# Patient Record
Sex: MALE | Race: WHITE | NOT HISPANIC OR LATINO | Employment: FULL TIME | URBAN - NONMETROPOLITAN AREA
[De-identification: names, ages, dates, MRNs, and addresses within clinical notes are randomized per-mention and may not be internally consistent; named-entity substitution may affect disease eponyms.]

---

## 2024-05-16 ENCOUNTER — HOSPITAL ENCOUNTER (EMERGENCY)
Facility: HOSPITAL | Age: 44
Discharge: HOME | End: 2024-05-16
Payer: MEDICAID

## 2024-05-16 VITALS
HEIGHT: 72 IN | SYSTOLIC BLOOD PRESSURE: 110 MMHG | OXYGEN SATURATION: 95 % | RESPIRATION RATE: 14 BRPM | BODY MASS INDEX: 26.01 KG/M2 | WEIGHT: 192 LBS | DIASTOLIC BLOOD PRESSURE: 68 MMHG | TEMPERATURE: 97.4 F | HEART RATE: 59 BPM

## 2024-05-16 DIAGNOSIS — M79.604 RIGHT LEG PAIN: ICD-10-CM

## 2024-05-16 DIAGNOSIS — R60.0 LEG EDEMA, RIGHT: Primary | ICD-10-CM

## 2024-05-16 LAB — D DIMER PPP FEU-MCNC: 315 NG/ML FEU

## 2024-05-16 PROCEDURE — 99283 EMERGENCY DEPT VISIT LOW MDM: CPT

## 2024-05-16 PROCEDURE — 36415 COLL VENOUS BLD VENIPUNCTURE: CPT | Performed by: PHYSICIAN ASSISTANT

## 2024-05-16 PROCEDURE — 85379 FIBRIN DEGRADATION QUANT: CPT | Performed by: PHYSICIAN ASSISTANT

## 2024-05-16 ASSESSMENT — PAIN - FUNCTIONAL ASSESSMENT: PAIN_FUNCTIONAL_ASSESSMENT: 0-10

## 2024-05-16 ASSESSMENT — ENCOUNTER SYMPTOMS
EYE PAIN: 0
FEVER: 0
SHORTNESS OF BREATH: 0
ARTHRALGIAS: 0
CHILLS: 0
ABDOMINAL PAIN: 0
VOMITING: 0
BACK PAIN: 0
SEIZURES: 0
COUGH: 0
SORE THROAT: 0
PALPITATIONS: 0
COLOR CHANGE: 0
DYSURIA: 0
HEMATURIA: 0

## 2024-05-16 ASSESSMENT — COLUMBIA-SUICIDE SEVERITY RATING SCALE - C-SSRS
6. HAVE YOU EVER DONE ANYTHING, STARTED TO DO ANYTHING, OR PREPARED TO DO ANYTHING TO END YOUR LIFE?: NO
1. IN THE PAST MONTH, HAVE YOU WISHED YOU WERE DEAD OR WISHED YOU COULD GO TO SLEEP AND NOT WAKE UP?: NO
2. HAVE YOU ACTUALLY HAD ANY THOUGHTS OF KILLING YOURSELF?: NO

## 2024-05-16 ASSESSMENT — PAIN SCALES - GENERAL: PAINLEVEL_OUTOF10: 8
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
## 2024-05-16 NOTE — DISCHARGE INSTRUCTIONS
You have a venous duplex of your right lower extremity scheduled tomorrow morning at 7 AM.  Please go to the main entrance of the hospital to register.  They will direct you to the correct location.  If your venous duplex is positive they will tell you to come back to the emergency room or they will call your family physician.

## 2024-05-17 ENCOUNTER — TELEPHONE (OUTPATIENT)
Dept: EMERGENCY MEDICINE | Facility: HOSPITAL | Age: 44
End: 2024-05-17

## 2024-05-17 ENCOUNTER — HOSPITAL ENCOUNTER (OUTPATIENT)
Dept: VASCULAR MEDICINE | Facility: HOSPITAL | Age: 44
Discharge: HOME | End: 2024-05-17
Payer: MEDICAID

## 2024-05-17 DIAGNOSIS — R60.0 LEG EDEMA, RIGHT: ICD-10-CM

## 2024-05-17 DIAGNOSIS — M79.661 PAIN IN RIGHT LOWER LEG: ICD-10-CM

## 2024-05-17 DIAGNOSIS — M79.604 RIGHT LEG PAIN: ICD-10-CM

## 2024-05-17 PROCEDURE — 93971 EXTREMITY STUDY: CPT | Performed by: INTERNAL MEDICINE

## 2024-05-17 PROCEDURE — 93971 EXTREMITY STUDY: CPT

## 2024-06-27 ENCOUNTER — APPOINTMENT (OUTPATIENT)
Dept: RADIOLOGY | Facility: HOSPITAL | Age: 44
End: 2024-06-27
Payer: MEDICAID

## 2024-06-27 ENCOUNTER — HOSPITAL ENCOUNTER (EMERGENCY)
Facility: HOSPITAL | Age: 44
Discharge: HOME | End: 2024-06-27
Attending: EMERGENCY MEDICINE
Payer: MEDICAID

## 2024-06-27 VITALS
RESPIRATION RATE: 18 BRPM | TEMPERATURE: 98.2 F | HEART RATE: 62 BPM | SYSTOLIC BLOOD PRESSURE: 131 MMHG | DIASTOLIC BLOOD PRESSURE: 89 MMHG | OXYGEN SATURATION: 96 % | BODY MASS INDEX: 25.73 KG/M2 | WEIGHT: 190 LBS | HEIGHT: 72 IN

## 2024-06-27 DIAGNOSIS — S93.491A SPRAIN OF OTHER LIGAMENT OF RIGHT ANKLE, INITIAL ENCOUNTER: Primary | ICD-10-CM

## 2024-06-27 DIAGNOSIS — S93.601A FOOT SPRAIN, RIGHT, INITIAL ENCOUNTER: ICD-10-CM

## 2024-06-27 PROCEDURE — 99284 EMERGENCY DEPT VISIT MOD MDM: CPT

## 2024-06-27 PROCEDURE — 73610 X-RAY EXAM OF ANKLE: CPT | Mod: RT

## 2024-06-27 PROCEDURE — 73630 X-RAY EXAM OF FOOT: CPT | Mod: RT

## 2024-06-27 PROCEDURE — 73610 X-RAY EXAM OF ANKLE: CPT | Mod: RIGHT SIDE | Performed by: RADIOLOGY

## 2024-06-27 PROCEDURE — 73630 X-RAY EXAM OF FOOT: CPT | Mod: RIGHT SIDE | Performed by: RADIOLOGY

## 2024-06-27 ASSESSMENT — VISUAL ACUITY: OU: 1

## 2024-06-27 ASSESSMENT — PAIN - FUNCTIONAL ASSESSMENT: PAIN_FUNCTIONAL_ASSESSMENT: 0-10

## 2024-06-27 ASSESSMENT — PAIN DESCRIPTION - LOCATION: LOCATION: FOOT

## 2024-06-27 ASSESSMENT — COLUMBIA-SUICIDE SEVERITY RATING SCALE - C-SSRS
1. IN THE PAST MONTH, HAVE YOU WISHED YOU WERE DEAD OR WISHED YOU COULD GO TO SLEEP AND NOT WAKE UP?: NO
6. HAVE YOU EVER DONE ANYTHING, STARTED TO DO ANYTHING, OR PREPARED TO DO ANYTHING TO END YOUR LIFE?: NO
2. HAVE YOU ACTUALLY HAD ANY THOUGHTS OF KILLING YOURSELF?: NO

## 2024-06-27 ASSESSMENT — PAIN SCALES - GENERAL: PAINLEVEL_OUTOF10: 8

## 2024-06-27 NOTE — ED PROVIDER NOTES
Twisting injury to right foot and ankle.  This 43-year-old white male states that yesterday he twisted his foot and ankle when he stepped into a hole at his workplace.  He states that he had an injury to the same foot approximately 5 years ago and had to wear a boot never had surgery..  Patient states that pain is located primarily in the lateral aspect of the right foot and the anterior ankle area.  He denies any proximal fibular pain.  Denies any other injury from this fall.      History provided by:  Patient   used: No         Physical Exam  Vitals and nursing note reviewed.   Constitutional:       General: He is awake.      Appearance: Normal appearance. He is normal weight.   HENT:      Head: Normocephalic and atraumatic.      Right Ear: Hearing and external ear normal.      Left Ear: Hearing and external ear normal.      Nose: Nose normal. No congestion or rhinorrhea.      Mouth/Throat:      Lips: Pink.      Mouth: Mucous membranes are moist.      Pharynx: Oropharynx is clear. No oropharyngeal exudate or posterior oropharyngeal erythema.   Eyes:      General: Lids are normal. Vision grossly intact.         Right eye: No discharge.         Left eye: No discharge.      Extraocular Movements: Extraocular movements intact.      Conjunctiva/sclera: Conjunctivae normal.      Pupils: Pupils are equal, round, and reactive to light.   Cardiovascular:      Rate and Rhythm: Normal rate and regular rhythm.      Pulses: Normal pulses.      Heart sounds: Normal heart sounds. No murmur heard.     No friction rub. No gallop.   Pulmonary:      Effort: Pulmonary effort is normal. No respiratory distress.      Breath sounds: Normal breath sounds. No stridor. No wheezing, rhonchi or rales.   Chest:      Chest wall: No tenderness.   Abdominal:      General: Abdomen is flat. Bowel sounds are normal. There is no distension.      Palpations: Abdomen is soft. There is no mass.      Tenderness: There is no abdominal  tenderness. There is no guarding or rebound.      Hernia: No hernia is present.   Musculoskeletal:         General: No swelling, deformity or signs of injury. Normal range of motion.      Cervical back: Full passive range of motion without pain, normal range of motion and neck supple.      Right lower leg: Swelling and tenderness present. No edema.      Left lower leg: Normal. No edema.        Legs:         Feet:       Comments: Evaluation of the right lower extremity revealed distal pulses are +2/4 and present the dorsalis pedis and tibialis.  Cap refill less than 2 seconds and light touch sensations intact.  Patient has some tenderness along the lateral aspect of the foot along the base of the fifth metatarsal.  Patient also has some anterior ankle tenderness with associated swelling.  No proximal fibular tenderness on examination.  Patient has normal +12 5 strength with dorsi and plantarflexion at the ankle.   Skin:     General: Skin is warm and dry.      Capillary Refill: Capillary refill takes less than 2 seconds.      Coloration: Skin is not jaundiced or pale.      Findings: No bruising, erythema, lesion or rash.   Neurological:      General: No focal deficit present.      Mental Status: He is alert and oriented to person, place, and time.      GCS: GCS eye subscore is 4. GCS verbal subscore is 5. GCS motor subscore is 6.      Cranial Nerves: Cranial nerves 2-12 are intact. No cranial nerve deficit.      Sensory: Sensation is intact. No sensory deficit.      Motor: Motor function is intact. No weakness.      Coordination: Coordination is intact. Coordination normal.      Deep Tendon Reflexes: Reflexes normal.   Psychiatric:         Attention and Perception: Attention and perception normal.         Mood and Affect: Mood normal.         Speech: Speech normal.         Behavior: Behavior normal. Behavior is cooperative.         Thought Content: Thought content normal.         Cognition and Memory: Cognition and  memory normal.         Judgment: Judgment normal.          Labs Reviewed - No data to display     XR foot right 3+ views   Final Result   No evidence for acute injury.   Signed by William Garcia MD      XR ankle right 3+ views   Final Result   No evidence for acute injury.   Signed by William Garcia MD           Procedures     Medical Decision Making  Patient was seen and evaluated due to an injury to his right foot and ankle.  Clinically patient has anterior ankle sprain and right foot sprain.  Patient imaging of the right ankle and foot performed these were negative for acute fracture or acute abnormality.  I did have a discussion with patient concerning his x-ray results and he was treated with an Aircast and crutches and referred to a primary care doctor for follow-up.  He was also provided a list of primary care doctors locally that he could follow-up with.         Diagnoses as of 07/01/24 0720   Sprain of other ligament of right ankle, initial encounter   Foot sprain, right, initial encounter                    Shawn Caldwell, DO  07/01/24 0720

## 2024-12-12 ENCOUNTER — EVALUATION (OUTPATIENT)
Dept: OCCUPATIONAL THERAPY | Facility: CLINIC | Age: 44
End: 2024-12-12
Payer: MEDICAID

## 2024-12-12 DIAGNOSIS — S62.102A LEFT WRIST FRACTURE: Primary | ICD-10-CM

## 2024-12-12 DIAGNOSIS — S42.401A CLOSED FRACTURE DISLOCATION OF RIGHT ELBOW: ICD-10-CM

## 2024-12-12 PROCEDURE — 97110 THERAPEUTIC EXERCISES: CPT | Mod: GO

## 2024-12-12 PROCEDURE — 97165 OT EVAL LOW COMPLEX 30 MIN: CPT | Mod: GO

## 2024-12-12 ASSESSMENT — ENCOUNTER SYMPTOMS
DEPRESSION: 0
LOSS OF SENSATION IN FEET: 0
OCCASIONAL FEELINGS OF UNSTEADINESS: 0

## 2024-12-12 ASSESSMENT — PAIN SCALES - GENERAL: PAINLEVEL_OUTOF10: 8

## 2024-12-12 ASSESSMENT — PAIN DESCRIPTION - DESCRIPTORS: DESCRIPTORS: BURNING

## 2024-12-12 ASSESSMENT — PAIN - FUNCTIONAL ASSESSMENT: PAIN_FUNCTIONAL_ASSESSMENT: 0-10

## 2024-12-12 NOTE — LETTER
December 12, 2024    Kya Campbell MD  [address]  [place] [id number]    Patient: Ga Camp   YOB: 1980   Date of Visit: 12/12/2024       Dear Kya Campbell MD  [address]  [place] [address]    The attached plan of care is being sent to you because your patient’s medical reimbursement requires that you certify the plan of care. Your signature is required to allow uninterrupted insurance coverage.      You may indicate your approval by signing below and faxing this form back to us at Dept Fax: [phone].    Please call Dept: [phone] with any questions or concerns.    Thank you for this referral,        Twila Harris OT  [address]  [address] [place] [id number]    Payer: Payor: [employer] / Plan: [employer] / Product Type: *No Product type* /                                                                         Date:     Dear Twila Harris OT,     Re: Mr. Ga Camp, MRN:[id number]    I certify that I have reviewed the attached plan of care and it is medically necessary for Mr. Ga Camp (1980) who is under my care.          ______________________________________                    _________________  Provider name and credentials                                           Date and time                                                                                           Plan of Care 12/12/24   Effective from: 12/12/2024  Effective to: 3/6/2025    Plan ID: [id number]            Participants as of Finalize on 12/12/2024    Name Type Comments Contact Info    Kya Campbell MD Referring Provider  [phone]    Twila Harris OT Occupational Therapist  [phone]       Last Plan Note     Author: Twila Harris OT Status: Incomplete Last edited: 12/12/2024  5:15 PM       Occupational Therapy    Evaluation/Treatment    Patient Name: Ga VERMA  Conrado  MRN: [id number]  : 1980  Today's Date: 24     Time Calculation  Start Time: 1715  Stop Time: 1800  Time Calculation (min): 45 min  Therapeutic Procedure Codes:  OT Evaluation Time Entry  OT Evaluation (Low) Time Entry: 35   OT Therapeutic Procedures Time Entry  Therapeutic Exercise Time Entry: 10                         Subjective  Current Problem:  1. Left wrist fracture  Referral to Occupational Therapy    Follow Up In Occupational Therapy      2. 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 Pt went to ER and had emergency surgery. Pt was in the hospital for about 7 days. Pt has been referred to OT to increase ROM and strength when appropriate . Pt is on Wbing precautions and has metal in BUE. Pt would like to gain ROM and be able to complete ADLs.     General  Reason for Referral: 1. Left wrist fracture  Referral to Occupational Therapy    2. Closed fracture dislocation of right elbow  Referred By: Adrian  General Comment: eval        Precautions:  Precautions Comment: non WB for R elbow and L wrist; Metal in BUE     I have reviewed patients medical history form.   Pain:  Pain Assessment  Pain Assessment: 0-10  0-10 (Numeric) Pain Score: 8  Pain Type: Acute pain  Pain Location: Arm  Pain Orientation: Right, Left  Pain Descriptors: Burning    Objective     Home Living:  Type of Home: Mobile home  Lives With: Spouse   Prior Function:  Level of [place]: Independent with ADLs and functional transfers   ADL/IADL:   All ADL and IADL - MAX A    Off work   Therapy/Activity:    DOI: Post-op:   Re-check due on:    Exercises: Reps:                           HEP provided to pt on BUE ROM HEP provided to pt including BUE ROM. Pt instructed to complete 3x a day, 10 reps within pain free range. Handouts  provided to pt.    Activities:                    Modalities:                    Manual:                    Functional review:  FM, and ROM   Completed on: 12/12/2024      Measurements:   Elbow ROM:  WFL unless documented below   Flexion Extension  Supination Pronation   Right 95 +25 35 25   Left 150 0 45 65     Wrist Measurements:  WFL unless documented below   Flexion  Extension Radial Dev Ulnar Dev   Right 45 40 0 25   Left 45 10 5 25     Digit Measurements:Left   WFL unless documented below   MCP PIP DIP DPC   Thumb 30 30 NA 3   Index 55 80 60 4.5   Long 55 80 65 2.5   Ring 55 80 65 3   Small 50 80 65 2     Digit Measurements: Right  WFL unless documented below   MCP PIP DIP DPC   Thumb 55 55 NA 2.5   Index 45 70 45 4   Long 45 75 60 4   Ring 35 75 60 2.5   Small 35 75 60 3.5       Nine Hole Peg Test:  RUE 34 seconds   LUE 30 seconds       Sensation:   Pt reported WFL     Outcome Measures:   Quickdash Scores: 55;100%    OP EDUCATION:  Education  Individual(s) Educated: Patient  Education Provided: Diagnosis & Precautions, POC discussed and agreed upon  Home Program: [facility], Handout issued  Risk and Benefits Discussed with Patient/Caregiver/Other: yes  Patient/Caregiver Demonstrated Understanding: yes  Plan of Care Discussed and Agreed Upon: yes  Patient Response to Education: Patient/Caregiver Verbalized Understanding of Information    Assessment:  Pt is a 44 y.o. male who presents to this facility with performance deficits in BUE limiting ability to complete ADL and IADL tasks. Pt  provided with HEP including BUE ROM. Handouts provided to pt. Pt demonstrated understanding. OT assessed ROM and FM, pt showed deficits in FM, ROM. Pt would benefit from skilled OT to address deficits. When appropriate, pt should be assessed for strength.           Plan:  Treatment Interventions: UE strengthening/ROM, ADL retraining, Patient/family training, Endurance training, Fine motor coordination activities, Compensatory technique  education, UE splinting, Continued evaluation  OT Plan: Occupational therapy intervention plan to include education/instruction, electrical stimulation, hot pack, ultrasound, manual therapy,  orthotic fitting/training, self-care/home management, therapeutic exercises, therapeutic activities, and home program.  Frequency: 2x a week  Duration: 8x weeks    Goals:  Active       OT Goals       Develop and issue HEP to help maximize ROM, strength and tolerance to help maximize return to all pre-onset activities.        Start:  12/12/24    Expected End:  03/06/25            LTG - Patient will indicate/ demonstrate the ability to resume all preinjury ADLs and IADLs without significant limits secondary to decreased ROM, decreased strength and/or pain as indicated by Quickdash score of less than 20%.        Start:  12/12/24    Expected End:  03/06/25            Patient will demonstrate a progressive increase in ROM as appropriate with BUE to be within 5-10 degrees of age norms to help patient resume normal ADL and IADL function.        Start:  12/12/24    Expected End:  03/06/25            Pain to be less than or equal to 1/10 with greater than or equal to 45 minutes activity.        Start:  12/12/24    Expected End:  03/06/25            Pt will demonstrate increased  strength, when appropriate, with BUE  to be greater than or equal to 80% of the norms to help patient resume ADLs and IADLs.        Start:  12/12/24    Expected End:  03/06/25                   Current Participants as of 12/12/2024    Name Type Comments Contact Info    Kya Campbell MD Referring Provider  [phone]    Signature pending    Twila Harris OT Occupational Therapist  [phone]    Electronically signed by Twila Harris OT at 12/12/2024 4492 EST

## 2024-12-12 NOTE — PROGRESS NOTES
Occupational Therapy    Evaluation/Treatment    Patient Name: Ga Camp  MRN: [id number]  : 1980  Today's Date: 24     Time Calculation  Start Time: 1715  Stop Time: 1800  Time Calculation (min): 45 min  Therapeutic Procedure Codes:  OT Evaluation Time Entry  OT Evaluation (Low) Time Entry: 35   OT Therapeutic Procedures Time Entry  Therapeutic Exercise Time Entry: 10                         Subjective   Current Problem:  1. Left wrist fracture  Referral to Occupational Therapy    Follow Up In Occupational Therapy      2. 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 Pt went to ER and had emergency surgery. Pt was in the hospital for about 7 days. Pt has been referred to OT to increase ROM and strength when appropriate . Pt is on Wbing precautions and has metal in BUE. Pt would like to gain ROM and be able to complete ADLs.     General  Reason for Referral: 1. Left wrist fracture  Referral to Occupational Therapy    2. Closed fracture dislocation of right elbow  Referred By: Adrian  General Comment: eval        Precautions:  Precautions Comment: non WB for R elbow and L wrist; Metal in BUE     I have reviewed patients medical history form.   Pain:  Pain Assessment  Pain Assessment: 0-10  0-10 (Numeric) Pain Score: 8  Pain Type: Acute pain  Pain Location: Arm  Pain Orientation: Right, Left  Pain Descriptors: Burning    Objective      Home Living:  Type of Home: Mobile home  Lives With: Spouse   Prior Function:  Level of [place]: Independent with ADLs and functional transfers   ADL/IADL:   All ADL and IADL - MAX A    Off work   Therapy/Activity:    DOI: Post-op:   Re-check due on:    Exercises: Reps:                           HEP provided to pt on BUE ROM HEP provided to pt including BUE ROM. Pt  instructed to complete 3x a day, 10 reps within pain free range. Handouts provided to pt.    Activities:                    Modalities:                    Manual:                    Functional review:  FM, and ROM   Completed on: 12/12/2024      Measurements:   Elbow ROM:  WFL unless documented below   Flexion Extension  Supination Pronation   Right 95 +25 35 25   Left 150 0 45 65     Wrist Measurements:  WFL unless documented below   Flexion  Extension Radial Dev Ulnar Dev   Right 45 40 0 25   Left 45 10 5 25     Digit Measurements:Left   WFL unless documented below   MCP PIP DIP DPC   Thumb 30 30 NA 3   Index 55 80 60 4.5   Long 55 80 65 2.5   Ring 55 80 65 3   Small 50 80 65 2     Digit Measurements: Right  WFL unless documented below   MCP PIP DIP DPC   Thumb 55 55 NA 2.5   Index 45 70 45 4   Long 45 75 60 4   Ring 35 75 60 2.5   Small 35 75 60 3.5       Nine Hole Peg Test:  RUE 34 seconds   LUE 30 seconds       Sensation:   Pt reported WFL     Outcome Measures:   Quickdash Scores: 55;100%    OP EDUCATION:  Education  Individual(s) Educated: Patient  Education Provided: Diagnosis & Precautions, POC discussed and agreed upon  Home Program: [facility], Handout issued  Risk and Benefits Discussed with Patient/Caregiver/Other: yes  Patient/Caregiver Demonstrated Understanding: yes  Plan of Care Discussed and Agreed Upon: yes  Patient Response to Education: Patient/Caregiver Verbalized Understanding of Information    Assessment:  Pt is a 44 y.o. male who presents to this facility with performance deficits in BUE limiting ability to complete ADL and IADL tasks. Pt  provided with HEP including BUE ROM. Handouts provided to pt. Pt demonstrated understanding. OT assessed ROM and FM, pt showed deficits in FM, ROM. Pt would benefit from skilled OT to address deficits. When appropriate, pt should be assessed for strength.           Plan:  Treatment Interventions: UE strengthening/ROM, ADL retraining, Patient/family training,  Endurance training, Fine motor coordination activities, Compensatory technique education, UE splinting, Continued evaluation  OT Plan: Occupational therapy intervention plan to include education/instruction, electrical stimulation, hot pack, ultrasound, manual therapy,  orthotic fitting/training, self-care/home management, therapeutic exercises, therapeutic activities, and home program.  Frequency: 2x a week  Duration: 8x weeks    Goals:  Active       OT Goals       Develop and issue HEP to help maximize ROM, strength and tolerance to help maximize return to all pre-onset activities.        Start:  12/12/24    Expected End:  03/06/25            LTG - Patient will indicate/ demonstrate the ability to resume all preinjury ADLs and IADLs without significant limits secondary to decreased ROM, decreased strength and/or pain as indicated by Quickdash score of less than 20%.        Start:  12/12/24    Expected End:  03/06/25            Patient will demonstrate a progressive increase in ROM as appropriate with BUE to be within 5-10 degrees of age norms to help patient resume normal ADL and IADL function.        Start:  12/12/24    Expected End:  03/06/25            Pain to be less than or equal to 1/10 with greater than or equal to 45 minutes activity.        Start:  12/12/24    Expected End:  03/06/25            Pt will demonstrate increased  strength, when appropriate, with BUE  to be greater than or equal to 80% of the norms to help patient resume ADLs and IADLs.        Start:  12/12/24    Expected End:  03/06/25

## 2024-12-17 ENCOUNTER — TREATMENT (OUTPATIENT)
Dept: OCCUPATIONAL THERAPY | Facility: CLINIC | Age: 44
End: 2024-12-17
Payer: MEDICAID

## 2024-12-17 DIAGNOSIS — S42.401A CLOSED FRACTURE DISLOCATION OF RIGHT ELBOW: ICD-10-CM

## 2024-12-17 DIAGNOSIS — S62.102A LEFT WRIST FRACTURE: ICD-10-CM

## 2024-12-17 PROCEDURE — 97110 THERAPEUTIC EXERCISES: CPT | Mod: GO,CO

## 2024-12-17 PROCEDURE — L3908 WHO COCK-UP NONMOLDE PRE OTS: HCPCS

## 2024-12-17 PROCEDURE — 97530 THERAPEUTIC ACTIVITIES: CPT | Mod: GO,CO

## 2024-12-17 ASSESSMENT — PAIN DESCRIPTION - DESCRIPTORS: DESCRIPTORS: BURNING;DISCOMFORT

## 2024-12-17 ASSESSMENT — PAIN SCALES - GENERAL: PAINLEVEL_OUTOF10: 8

## 2024-12-17 ASSESSMENT — PAIN - FUNCTIONAL ASSESSMENT: PAIN_FUNCTIONAL_ASSESSMENT: 0-10

## 2024-12-17 NOTE — PROGRESS NOTES
"Occupational Therapy    Occupational Therapy Treatment    Name: Ga Camp  MRN: [id number]  : 1980  Date: 24    Time Entry:  Time Calculation  Start Time: 830  Stop Time: 915  Time Calculation (min): 45 min        OT Therapeutic Procedures Time Entry  Therapeutic Activity Time Entry: 35  Therapeutic Exercise Time Entry: 10                Assessment: Patient tolerating activities well, demos understanding of exercises to be completed at home, Demos understanding of brace and tubi .    Plan: Continue with current POC towards OT goals  OT Plan: TE,TA, Modalities  Duration: 8 weeks  Onset Date: 24  Certification Period Start Date: 24  Certification Period End Date: 25  Rehab Potential: Fair  Plan of Care Agreement: Patient    Subjective   General: Patient states he is still pretty sore, states L wrist feels very \"hot\"    General  Reason for Referral: 1. Left wrist fracture  Referral to Occupational Therapy    2. Closed fracture dislocation of right elbow  Referred By: Adrian  General Comment: Visit 1  Precautions:  Precautions Comment: non WB for R elbow and L wrist; Metal in BUE  Pain Assessment:  Pain Assessment  Pain Assessment: 0-10  0-10 (Numeric) Pain Score: 8  Pain Type: Acute pain  Pain Location: Arm  Pain Orientation: Right, Left  Pain Descriptors: Burning, Discomfort    Objective      Splinting  Location: L wrist  Type: Prefab wrist cock up  Splinting Education: Fitting, [place], [place], Wear schedule, Precautions    Therapy/Activity: Therapeutic Exercise  Therapeutic Exercise Performed: Yes  Therapeutic Exercise Activity 1: Initiated yellow foam block exercises for B hands (L hand tip pinch and gentle grasp, R hand grasp with extension)    Therapeutic Activity  Therapeutic Activity Performed: Yes  Therapeutic Activity 1: BUE AROM over ball flex/ext x 10 w/ 10 second holds each  Therapeutic Activity 2: Educated on heat for elbow and ice for wrist  Therapeutic Activity " 3: Educated on stretches for R elbow, L wrist  Therapeutic Activity 4: Provided with and educated on wrist splint  Therapeutic Activity 5: Provided with tubi  for L wrist for edema management      Goals:  Active       OT Goals       Develop and issue HEP to help maximize ROM, strength and tolerance to help maximize return to all pre-onset activities.        Start:  12/12/24    Expected End:  03/06/25            LTG - Patient will indicate/ demonstrate the ability to resume all preinjury ADLs and IADLs without significant limits secondary to decreased ROM, decreased strength and/or pain as indicated by Quickdash score of less than 20%.        Start:  12/12/24    Expected End:  03/06/25            Patient will demonstrate a progressive increase in ROM as appropriate with BUE to be within 5-10 degrees of age norms to help patient resume normal ADL and IADL function.        Start:  12/12/24    Expected End:  03/06/25            Pain to be less than or equal to 1/10 with greater than or equal to 45 minutes activity.        Start:  12/12/24    Expected End:  03/06/25            Pt will demonstrate increased  strength, when appropriate, with BUE  to be greater than or equal to 80% of the norms to help patient resume ADLs and IADLs.        Start:  12/12/24    Expected End:  03/06/25

## 2024-12-19 ENCOUNTER — TREATMENT (OUTPATIENT)
Dept: OCCUPATIONAL THERAPY | Facility: CLINIC | Age: 44
End: 2024-12-19
Payer: MEDICAID

## 2024-12-19 DIAGNOSIS — S42.401A CLOSED FRACTURE DISLOCATION OF RIGHT ELBOW: ICD-10-CM

## 2024-12-19 DIAGNOSIS — S62.102A LEFT WRIST FRACTURE: ICD-10-CM

## 2024-12-19 PROCEDURE — L3912 HFO FLEXION GLOVE PRE OTS: HCPCS

## 2024-12-19 PROCEDURE — 97530 THERAPEUTIC ACTIVITIES: CPT | Mod: GO

## 2024-12-19 PROCEDURE — 97140 MANUAL THERAPY 1/> REGIONS: CPT | Mod: GO

## 2024-12-19 ASSESSMENT — PAIN - FUNCTIONAL ASSESSMENT: PAIN_FUNCTIONAL_ASSESSMENT: 0-10

## 2024-12-19 ASSESSMENT — PAIN SCALES - GENERAL
PAINLEVEL_OUTOF10: 3
PAINLEVEL_OUTOF10: 5 - MODERATE PAIN

## 2024-12-19 NOTE — PROGRESS NOTES
Occupational Therapy    OT Treatment    Patient Name: Ga Camp  MRN: [id number]  Today's Date: 12/19/2024     Time Calculation  Start Time: 0915  Stop Time: 1000  Time Calculation (min): 45 min    Visit Number: 3  Therapeutic Procedures:      OT Therapeutic Procedures Time Entry  Manual Therapy Time Entry: 25  Therapeutic Activity Time Entry: 20                         Current Problem:  1. Left wrist fracture  Follow Up In Occupational Therapy      2. Closed fracture dislocation of right elbow  Follow Up In Occupational Therapy          Subjective     General: Pt came in with son. Patient is independent with current HEP. Pt reported he is making progress and he is using heat at home.         Reason for Referral: 1. Left wrist fracture  Referral to Occupational Therapy    2. Closed fracture dislocation of right elbow  Referred By: Adrian Eid Comment: Visit 2  Pain:  Pain Assessment  Pain Assessment: 0-10  0-10 (Numeric) Pain Score: 5 - Moderate pain  Pain Type: Acute pain  Pain Location: Hand  Pain Orientation: Left  Multiple Pain Sites: Two  Pain 2  Pain Score 2: 3  Pain Location 2: Elbow  Pain Orientation 2: Right    Objective       Therapy/Activity:      Therapeutic Activity  Therapeutic Activity 1: Mosit heat with PROM  Therapeutic Activity 2: EDU on scar massage and edema control  Manual Therapy  Manual Therapy Performed: Yes  Manual Therapy Activity 1: PROM on BUE 10 reps witg 5 sec holds  Manual Therapy Activity 2: STM on BUE to help with edema           OP EDUCATION:   Cont HEP.  Pt educated on scar massage and edema management.     Assessment:   Pt participated in therapeutic exercises and activities as needed to increase ROM. Pt reported tightness with PROM from OT. Pt tolerated STM to help with tightness and edema control. Pt educated on scar massage and edema management. Pt given isotoner glove for LUE.      Plan:  OT Plan: TE,TA, Modalities  Duration: 8 weeks  Onset Date: 12/12/24  Certification  Period Start Date: 12/12/24  Certification Period End Date: 02/06/25  Rehab Potential: Fair  Plan of Care Agreement: Patient  Goals:  Active       OT Goals       Develop and issue HEP to help maximize ROM, strength and tolerance to help maximize return to all pre-onset activities.        Start:  12/12/24    Expected End:  03/06/25            LTG - Patient will indicate/ demonstrate the ability to resume all preinjury ADLs and IADLs without significant limits secondary to decreased ROM, decreased strength and/or pain as indicated by Quickdash score of less than 20%.        Start:  12/12/24    Expected End:  03/06/25            Patient will demonstrate a progressive increase in ROM as appropriate with BUE to be within 5-10 degrees of age norms to help patient resume normal ADL and IADL function.        Start:  12/12/24    Expected End:  03/06/25            Pain to be less than or equal to 1/10 with greater than or equal to 45 minutes activity.        Start:  12/12/24    Expected End:  03/06/25            Pt will demonstrate increased  strength, when appropriate, with BUE  to be greater than or equal to 80% of the norms to help patient resume ADLs and IADLs.        Start:  12/12/24    Expected End:  03/06/25

## 2024-12-24 ENCOUNTER — APPOINTMENT (OUTPATIENT)
Dept: OCCUPATIONAL THERAPY | Facility: CLINIC | Age: 44
End: 2024-12-24
Payer: MEDICAID

## 2024-12-24 ENCOUNTER — DOCUMENTATION (OUTPATIENT)
Dept: OCCUPATIONAL THERAPY | Facility: CLINIC | Age: 44
End: 2024-12-24
Payer: MEDICAID

## 2024-12-24 NOTE — PROGRESS NOTES
Occupational Therapy                 Therapy Communication Note    Patient Name: Ga Camp  MRN: [id number]  Department:   Room: Room/bed info not found  Today's Date: 12/24/2024     Discipline: Occupational Therapy          Missed Visit Reason:   Patient is ill    Missed Time: Cancel    Comment:

## 2024-12-26 ENCOUNTER — TREATMENT (OUTPATIENT)
Dept: OCCUPATIONAL THERAPY | Facility: CLINIC | Age: 44
End: 2024-12-26
Payer: MEDICAID

## 2024-12-26 DIAGNOSIS — S62.102A LEFT WRIST FRACTURE: ICD-10-CM

## 2024-12-26 DIAGNOSIS — S42.401A CLOSED FRACTURE DISLOCATION OF RIGHT ELBOW: ICD-10-CM

## 2024-12-26 PROCEDURE — 97140 MANUAL THERAPY 1/> REGIONS: CPT | Mod: GO,CO

## 2024-12-26 ASSESSMENT — PAIN DESCRIPTION - DESCRIPTORS
DESCRIPTORS: ACHING
DESCRIPTORS: ACHING;TIGHTNESS

## 2024-12-26 ASSESSMENT — PAIN SCALES - GENERAL
PAINLEVEL_OUTOF10: 3
PAINLEVEL_OUTOF10: 3

## 2024-12-26 ASSESSMENT — PAIN - FUNCTIONAL ASSESSMENT: PAIN_FUNCTIONAL_ASSESSMENT: 0-10

## 2024-12-26 NOTE — PROGRESS NOTES
Occupational Therapy    Occupational Therapy Treatment    Name: Ga Camp  MRN: [id number]  : 1980  Date: 24    Time Entry:  Time Calculation  Start Time: 1000  Stop Time: 1040  Time Calculation (min): 40 min        OT Therapeutic Procedures Time Entry  Manual Therapy Time Entry: 40                Assessment: Patient tolerating IASTM and STM well, some increased pain and tightness reported with all AAROM. Demos understanding of continuing exercises at home.    Plan: Continue with current POC towards OT goals  OT Plan: TE,TA, Modalities  Duration: 8 weeks  Onset Date: 24  Certification Period Start Date: 24  Certification Period End Date: 25  Rehab Potential: Fair  Plan of Care Agreement: Patient    Subjective   General: Patient states he is compliant with HEP, states he feels he is getting around better.    General  Reason for Referral: 1. Left wrist fracture  Referral to Occupational Therapy    2. Closed fracture dislocation of right elbow  Referred By: Adrian  General Comment: Visit 3  Precautions:  Precautions Comment: non WB for R elbow and L wrist; Metal in BUE  Pain Assessment:  Pain Assessment  Pain Assessment: 0-10  0-10 (Numeric) Pain Score: 3  Pain Type: Acute pain  Pain Location: Wrist  Pain Orientation: Left  Pain Descriptors: Aching  Multiple Pain Sites: Two  Pain 2  Pain Score 2: 3  Pain Location 2: Elbow  Pain Orientation 2: Right  Pain Descriptors 2: Aching, Tightness    Objective        Therapy/Activity:   Manual Therapy  Manual Therapy Performed: Yes  Manual Therapy Activity 1: IASTM HG9 and HG6 to L wrist and R elbow/forearm regions  Manual Therapy Activity 2: STM to L wrist and R elbow regions  Manual Therapy Activity 3: Retrograde massage to LUE  Manual Therapy Activity 4: AAROM to L wrist flex/ext/sup/pro/RD/UD  Manual Therapy Activity 5: AAROM to R elbow flext/ext/sup/pro      Goals:  Active       OT Goals       Develop and issue HEP to help maximize ROM,  strength and tolerance to help maximize return to all pre-onset activities.        Start:  12/12/24    Expected End:  03/06/25            LTG - Patient will indicate/ demonstrate the ability to resume all preinjury ADLs and IADLs without significant limits secondary to decreased ROM, decreased strength and/or pain as indicated by Quickdash score of less than 20%.        Start:  12/12/24    Expected End:  03/06/25            Patient will demonstrate a progressive increase in ROM as appropriate with BUE to be within 5-10 degrees of age norms to help patient resume normal ADL and IADL function.        Start:  12/12/24    Expected End:  03/06/25            Pain to be less than or equal to 1/10 with greater than or equal to 45 minutes activity.        Start:  12/12/24    Expected End:  03/06/25            Pt will demonstrate increased  strength, when appropriate, with BUE  to be greater than or equal to 80% of the norms to help patient resume ADLs and IADLs.        Start:  12/12/24    Expected End:  03/06/25

## 2024-12-30 ENCOUNTER — TREATMENT (OUTPATIENT)
Dept: OCCUPATIONAL THERAPY | Facility: CLINIC | Age: 44
End: 2024-12-30
Payer: MEDICAID

## 2024-12-30 DIAGNOSIS — S42.401A CLOSED FRACTURE DISLOCATION OF RIGHT ELBOW: ICD-10-CM

## 2024-12-30 DIAGNOSIS — S62.102A LEFT WRIST FRACTURE: ICD-10-CM

## 2024-12-30 PROCEDURE — 97530 THERAPEUTIC ACTIVITIES: CPT | Mod: GO,CO

## 2024-12-30 ASSESSMENT — PAIN DESCRIPTION - DESCRIPTORS
DESCRIPTORS: ACHING;SORE
DESCRIPTORS: ACHING

## 2024-12-30 ASSESSMENT — PAIN - FUNCTIONAL ASSESSMENT: PAIN_FUNCTIONAL_ASSESSMENT: 0-10

## 2024-12-30 ASSESSMENT — PAIN SCALES - GENERAL
PAINLEVEL_OUTOF10: 7
PAINLEVEL_OUTOF10: 4

## 2024-12-30 NOTE — PROGRESS NOTES
Occupational Therapy    Occupational Therapy Treatment    Name: Ga Camp  MRN: [id number]  : 1980  Date: 24    Time Entry:  Time Calculation  Start Time: 1300  Stop Time: 1340  Time Calculation (min): 40 min        OT Therapeutic Procedures Time Entry  Therapeutic Activity Time Entry: 40                Assessment: Patient karen increased ROM of L wrist and R elbow this date, some increased pain reported intermittently with AAROM. Demos understanding of continuing exercises and massage at home.    Plan: Continue with current POC towards OT goals  OT Plan: TE,TA, Modalities  Duration: 8 weeks  Onset Date: 24  Certification Period Start Date: 24  Certification Period End Date: 25  Rehab Potential: Fair  Plan of Care Agreement: Patient    Subjective   General: Patient states he thinks he over worked in R elbow, states it is very sore today.    General  Reason for Referral: 1. Left wrist fracture  Referral to Occupational Therapy    2. Closed fracture dislocation of right elbow  Referred By: Adrian  General Comment: Visit 4  Precautions:  Precautions Comment: non WB for R elbow and L wrist; Metal in BUE  Pain Assessment:  Pain Assessment  Pain Assessment: 0-10  0-10 (Numeric) Pain Score: 4  Pain Type: Acute pain  Pain Location: Wrist  Pain Orientation: Left  Pain Descriptors: Aching  Multiple Pain Sites: Two  Pain 2  Pain Score 2: 7  Pain Location 2: Elbow  Pain Orientation 2: Right  Pain Descriptors 2: Aching, Sore    Objective      Modalities:  Modalities Used: Yes  Modality 1: Untimed Hotpack    Therapy/Activity:   Therapeutic Activity  Therapeutic Activity Performed: Yes  Therapeutic Activity 1: Hot pack donned to R elbow, patient interview with hotpack donned  Therapeutic Activity 2: L hand on short handle reacher no resistance flipping MRMT pegs sup/pro x 30 pegs  Therapeutic Activity 3: R hand on Short handle reacher no resistance flipping MRMT pegs sup/pro in extension x 30  pegs  Therapeutic Activity 4: AAROM over ball with elbow extension in sup and pronation x 10 w/ 10 second holds each        Goals:  Active       OT Goals       Develop and issue HEP to help maximize ROM, strength and tolerance to help maximize return to all pre-onset activities.        Start:  12/12/24    Expected End:  03/06/25            LTG - Patient will indicate/ demonstrate the ability to resume all preinjury ADLs and IADLs without significant limits secondary to decreased ROM, decreased strength and/or pain as indicated by Quickdash score of less than 20%.        Start:  12/12/24    Expected End:  03/06/25            Patient will demonstrate a progressive increase in ROM as appropriate with BUE to be within 5-10 degrees of age norms to help patient resume normal ADL and IADL function.        Start:  12/12/24    Expected End:  03/06/25            Pain to be less than or equal to 1/10 with greater than or equal to 45 minutes activity.        Start:  12/12/24    Expected End:  03/06/25            Pt will demonstrate increased  strength, when appropriate, with BUE  to be greater than or equal to 80% of the norms to help patient resume ADLs and IADLs.        Start:  12/12/24    Expected End:  03/06/25

## 2025-01-02 ENCOUNTER — TREATMENT (OUTPATIENT)
Dept: OCCUPATIONAL THERAPY | Facility: CLINIC | Age: 45
End: 2025-01-02
Payer: MEDICAID

## 2025-01-02 DIAGNOSIS — S42.401A CLOSED FRACTURE DISLOCATION OF RIGHT ELBOW: ICD-10-CM

## 2025-01-02 DIAGNOSIS — S62.102A LEFT WRIST FRACTURE: ICD-10-CM

## 2025-01-02 PROCEDURE — 97530 THERAPEUTIC ACTIVITIES: CPT | Mod: GO,CO

## 2025-01-02 PROCEDURE — 97140 MANUAL THERAPY 1/> REGIONS: CPT | Mod: GO,CO

## 2025-01-02 ASSESSMENT — PAIN SCALES - GENERAL
PAINLEVEL_OUTOF10: 5 - MODERATE PAIN
PAINLEVEL_OUTOF10: 2

## 2025-01-02 ASSESSMENT — PAIN - FUNCTIONAL ASSESSMENT: PAIN_FUNCTIONAL_ASSESSMENT: 0-10

## 2025-01-02 ASSESSMENT — PAIN DESCRIPTION - DESCRIPTORS
DESCRIPTORS: ACHING
DESCRIPTORS: ACHING

## 2025-01-02 NOTE — PROGRESS NOTES
Occupational Therapy    Occupational Therapy Treatment    Name: Ga Camp  MRN: [id number]  : 1980  Date: 25    Time Entry:  Time Calculation  Start Time: 1300  Stop Time: 1344  Time Calculation (min): 44 min        OT Therapeutic Procedures Time Entry  Manual Therapy Time Entry: 30  Therapeutic Activity Time Entry: 14                Assessment: Some increased pain reported with manual this date however tolerable. Patient demos increased ROM of L wrist and R elbow (flex,ext).    Plan: Continue w/ current POC towards OT goals  OT Plan: TE,TA, Modalities  Duration: 8 weeks  Onset Date: 24  Certification Period Start Date: 24  Certification Period End Date: 25  Rehab Potential: Fair  Plan of Care Agreement: Patient    Subjective   General: Patient states he is doing okay today, states he has been massaging and stretching elbow and wrist daily. States heat helps.    General  Reason for Referral: 1. Left wrist fracture  Referral to Occupational Therapy    2. Closed fracture dislocation of right elbow  Referred By: Adrian  General Comment: Visit 4  Precautions:  Precautions Comment: non WB for R elbow and L wrist; Metal in BUE  Pain Assessment:  Pain Assessment  Pain Assessment: 0-10  0-10 (Numeric) Pain Score: 5 - Moderate pain  Pain Type: Acute pain  Pain Location: Wrist  Pain Orientation: Left  Pain Descriptors: Aching  Multiple Pain Sites: Two  Pain 2  Pain Score 2: 2  Pain Location 2: Elbow  Pain Orientation 2: Right  Pain Descriptors 2: Aching  Clinical Progression 2: Gradually improving    Objective      Modalities:  Modalities Used: Yes  Modality 1: Untimed Fluidotherapy, Untimed Hotpack    Therapy/Activity:   Therapeutic Activity  Therapeutic Activity Performed: Yes  Therapeutic Activity 1: L hand AROM while in fluido x 10 mins (Hot pack donned to R elbow while L hand in fluido)  Therapeutic Activity 2: LUE AAROM over ball flex/ext x 10    Manual Therapy  Manual Therapy  Performed: Yes  Manual Therapy Activity 1: IASTM HG9 and HG6 to L wrist and R elbow/forearm regions  Manual Therapy Activity 2: STM to L wrist and R elbow regions  Manual Therapy Activity 3: AAROM to L wrist flex/ext/sup/pro/RD/UD  Manual Therapy Activity 4: AAROM to R elbow flext/ext/sup/pro  Manual Therapy Activity 5: Vibrate massager to L wrist scar region      Goals:  Active       OT Goals       Develop and issue HEP to help maximize ROM, strength and tolerance to help maximize return to all pre-onset activities.        Start:  12/12/24    Expected End:  03/06/25            LTG - Patient will indicate/ demonstrate the ability to resume all preinjury ADLs and IADLs without significant limits secondary to decreased ROM, decreased strength and/or pain as indicated by Quickdash score of less than 20%.        Start:  12/12/24    Expected End:  03/06/25            Patient will demonstrate a progressive increase in ROM as appropriate with BUE to be within 5-10 degrees of age norms to help patient resume normal ADL and IADL function.        Start:  12/12/24    Expected End:  03/06/25            Pain to be less than or equal to 1/10 with greater than or equal to 45 minutes activity.        Start:  12/12/24    Expected End:  03/06/25            Pt will demonstrate increased  strength, when appropriate, with BUE  to be greater than or equal to 80% of the norms to help patient resume ADLs and IADLs.        Start:  12/12/24    Expected End:  03/06/25

## 2025-01-07 ENCOUNTER — APPOINTMENT (OUTPATIENT)
Dept: OCCUPATIONAL THERAPY | Facility: CLINIC | Age: 45
End: 2025-01-07
Payer: MEDICAID

## 2025-01-07 ENCOUNTER — DOCUMENTATION (OUTPATIENT)
Dept: OCCUPATIONAL THERAPY | Facility: CLINIC | Age: 45
End: 2025-01-07
Payer: MEDICAID

## 2025-01-07 NOTE — PROGRESS NOTES
Occupational Therapy                 Therapy Communication Note    Patient Name: Ga Camp  MRN: [id number]  Department:   Room: Room/bed info not found  Today's Date: 1/7/2025     Discipline: Occupational Therapy          Missed Visit Reason:   Patient no showed apt upon calling patient he attempted to cancel this mornings apt via auto reminder system however accidentally canceled this Thursday apts instead. Patient transferred to  to reschedule apts    Missed Time: No Show    Comment:

## 2025-01-09 ENCOUNTER — EVALUATION (OUTPATIENT)
Dept: PHYSICAL THERAPY | Facility: CLINIC | Age: 45
End: 2025-01-09
Payer: MEDICAID

## 2025-01-09 ENCOUNTER — APPOINTMENT (OUTPATIENT)
Dept: PHYSICAL THERAPY | Facility: CLINIC | Age: 45
End: 2025-01-09
Payer: MEDICAID

## 2025-01-09 ENCOUNTER — APPOINTMENT (OUTPATIENT)
Dept: OCCUPATIONAL THERAPY | Facility: CLINIC | Age: 45
End: 2025-01-09
Payer: MEDICAID

## 2025-01-09 ENCOUNTER — TREATMENT (OUTPATIENT)
Dept: OCCUPATIONAL THERAPY | Facility: CLINIC | Age: 45
End: 2025-01-09
Payer: MEDICAID

## 2025-01-09 DIAGNOSIS — S42.401A CLOSED FRACTURE DISLOCATION OF RIGHT ELBOW: ICD-10-CM

## 2025-01-09 DIAGNOSIS — S52.021D DISPLACED FRACTURE OF OLECRANON PROCESS WITHOUT INTRAARTICULAR EXTENSION OF RIGHT ULNA, SUBSEQUENT ENCOUNTER FOR CLOSED FRACTURE WITH ROUTINE HEALING: ICD-10-CM

## 2025-01-09 DIAGNOSIS — S62.102A LEFT WRIST FRACTURE: ICD-10-CM

## 2025-01-09 DIAGNOSIS — S52.502D UNSPECIFIED FRACTURE OF THE LOWER END OF LEFT RADIUS, SUBSEQUENT ENCOUNTER FOR CLOSED FRACTURE WITH ROUTINE HEALING: ICD-10-CM

## 2025-01-09 DIAGNOSIS — S32.810D MULTIPLE FRACTURES OF PELVIS WITH STABLE DISRUPTION OF PELVIC RING, SUBSEQUENT ENCOUNTER FOR FRACTURE WITH ROUTINE HEALING: ICD-10-CM

## 2025-01-09 DIAGNOSIS — M79.18 RIGHT BUTTOCK PAIN: Primary | ICD-10-CM

## 2025-01-09 PROCEDURE — 97110 THERAPEUTIC EXERCISES: CPT | Mod: GP

## 2025-01-09 PROCEDURE — 97530 THERAPEUTIC ACTIVITIES: CPT | Mod: GO

## 2025-01-09 PROCEDURE — 97161 PT EVAL LOW COMPLEX 20 MIN: CPT | Mod: GP

## 2025-01-09 PROCEDURE — 97140 MANUAL THERAPY 1/> REGIONS: CPT | Mod: GO

## 2025-01-09 ASSESSMENT — PAIN SCALES - GENERAL
PAINLEVEL_OUTOF10: 2
PAINLEVEL_OUTOF10: 5 - MODERATE PAIN
PAINLEVEL_OUTOF10: 0 - NO PAIN

## 2025-01-09 ASSESSMENT — ENCOUNTER SYMPTOMS
DEPRESSION: 0
LOSS OF SENSATION IN FEET: 0
OCCASIONAL FEELINGS OF UNSTEADINESS: 0

## 2025-01-09 ASSESSMENT — PATIENT HEALTH QUESTIONNAIRE - PHQ9
2. FEELING DOWN, DEPRESSED OR HOPELESS: NOT AT ALL
1. LITTLE INTEREST OR PLEASURE IN DOING THINGS: NOT AT ALL
SUM OF ALL RESPONSES TO PHQ9 QUESTIONS 1 AND 2: 0

## 2025-01-09 ASSESSMENT — PAIN DESCRIPTION - DESCRIPTORS: DESCRIPTORS: ACHING

## 2025-01-09 ASSESSMENT — PAIN - FUNCTIONAL ASSESSMENT
PAIN_FUNCTIONAL_ASSESSMENT: 0-10
PAIN_FUNCTIONAL_ASSESSMENT: 0-10

## 2025-01-09 NOTE — PROGRESS NOTES
Physical Therapy Evaluation and Treatment      Patient Name: Ga Camp  MRN: [id number]  Today's Date: 2025  : 1980  * No referring provider recorded for this case *  Time Calculation  Start Time: 1237  Stop Time: 1316  Time Calculation (min): 39 min    PT Evaluation Time Entry  PT Evaluation (Low) Time Entry: 25   PT Therapeutic Procedures Time Entry  Therapeutic Exercise Time Entry: 12                         Assessment:  Rehab Prognosis: Good  Barriers to Participation:  (none)    Plan:  Treatment/Interventions: Aquatic therapy, Cryotherapy, Dry needling, Education/ Instruction, Electrical stimulation, Gait training, Hot pack, Manual therapy, Self care/ home management, Therapeutic exercises, Other (comment) (IASTM/cupping)  PT Plan: Skilled PT  PT Frequency: 2 times per week  Duration: 4wks  Onset Date: 24  Rehab Potential: Good  Plan of Care Agreement: Patient    Current Problem:   1. Right buttock pain  Follow Up In Physical Therapy      2. Unspecified fracture of the lower end of left radius, subsequent encounter for closed fracture with routine healing  Referral to Physical Therapy      3. Displaced fracture of olecranon process without intraarticular extension of right ulna, subsequent encounter for closed fracture with routine healing  Referral to Physical Therapy      4. Multiple fractures of pelvis with stable disruption of pelvic ring, subsequent encounter for fracture with routine healing  Referral to Physical Therapy    Follow Up In Physical Therapy          Subjective    General:  General  Reason for Referral: R buttock pain  Referred By: Adrian  Past Medical History Relevant to Rehab: HX pelvic FX/surgery 24  General Comment:   C/C sx*/Max sx level*:5/10 R buttock/hip area  JOE/DOI/Work*?:  fall from heught; R SI screw placement 24  ADL makes worse*?:Wbng/twisting  ADL makes better?:NWBing  PLOF:Unlimited job/home tasks performed-YES:   (see  "goals)  Testing*:-----    Physical Findings*: Limited:                                               Strength ( R hip )                                                  POC:  Ongoing clinic PT to include:continue with open to closed chain ROM/PRE and gait/balance work as sujata;  also add core work as sujata; please avoid undue hip/LB sx exacerbation    Other: (copay*?-no  ) (fall risk*?- no  ) (ins visit limit*?- 30  )     Precautions:  Precautions  STEADI Fall Risk Score (The score of 4 or more indicates an increased risk of falling): 3  Precautions Comment: none    Pain:  Pain Assessment  Pain Assessment: 0-10  0-10 (Numeric) Pain Score: 5 - Moderate pain (max sx)  Pain Location: Buttocks  Pain Orientation: Right    Prior Level of Function:  Prior Function Per Pt/Caregiver Report  Vocational: Full time employment (construction)    Objective     Outcome Measures:  Other Measures  Lower Extremity Funtional Score (LEFS): 43     Treatments:  Hooklying clamshell x10 kosta  Hooklyig R SLR x10  Hooklying bridge x10  POC:  Ongoing clinic PT to include:continue with open to closed chain ROM/PRE and gait/balance work as sujata;  also add core work as sujata; please avoid undue hip/LB sx exacerbation    OP EDUCATION:  Hooklying clamshell   Hooklyig R SLR  Hooklying bridge    Goals:  Active       PT Problem       PT Goal 1       Start:  01/09/25    Expected End:  04/09/25       1. Independent HEP to allow for 50% reduction in max ADL C/C sx ( 5/10)_ 2-3wks  2. Survey score improvement from 43/80 to 55/80 (LEFS) 3-4wks  3. Strength increase to allow for improved ADL walking (from 4, 4+/5 to 5/5 R hip) 3-4wks         PT Goal 2       Start:  01/09/25    Expected End:  04/09/25       1. \"I want my  butt/hip  to feel better\".              HIP        Hip Observation  Observation Comment: slight to mild R hip compensations in stance           Lower Extremity Strength: WFL unless documented  MMT 5/5 max  RIGHT LEFT   Hip Flexion   4+  /5    5 /5 "   Hip Extension    4+ /5 5 /5   Hip Abduction   4 /5 5/5   Hip Adduction    4+ /5 5 /5   Knee Extension     5/5 5/5   Knee Flexion     5/5 5/5   Hip hip ER/IR   5/5;5/5 5/5;5/5                  Hip AROM WFL unless documented below  Hip AROM WFL: yes     Hip PROM WFL unless documented below  Hip PROM WFL: yes

## 2025-01-09 NOTE — PROGRESS NOTES
Occupational Therapy    OT Treatment    Patient Name: Ga Camp  MRN: [id number]  Today's Date: 1/9/2025     Time Calculation  Start Time: 1045  Stop Time: 1130  Time Calculation (min): 45 min    Visit Number: 7  Therapeutic Procedures:      OT Therapeutic Procedures Time Entry  Manual Therapy Time Entry: 35  Therapeutic Activity Time Entry: 10                         Current Problem:  1. Left wrist fracture  Follow Up In Occupational Therapy      2. Closed fracture dislocation of right elbow  Follow Up In Occupational Therapy          Subjective     General: Patient is independent with current HEP. Pt reported therapy is going well. Pt is going to be moving this weekend. Pt reported R LF is stiff.       OT Received On: 01/09/25  Reason for Referral: 1. Left wrist fracture  Referral to Occupational Therapy    2. Closed fracture dislocation of right elbow  Referred By: Adrian Eid Comment: Visit 5  Pain:  Pain Assessment  Pain Assessment: 0-10  0-10 (Numeric) Pain Score: 2  Pain Type: Acute pain  Pain Location: Wrist  Pain Orientation: Left  Pain Descriptors: Aching  Pain 2  Pain Score 2: 0 - No pain  Pain Location 2: Elbow  Pain Orientation 2: Right    Objective       Therapy/Activity:   Modalities  Modalities Used: Yes  Modality 1: Untimed Fluidotherapy, Untimed Hotpack  Therapeutic Activity  Therapeutic Activity 1: Velcro board completing SUP and PRO AROM 4 reps down and back  Manual Therapy  Manual Therapy Activity 1: IASTM HG9 and HG6 to L wrist and R elbow/forearm regions  Manual Therapy Activity 2: STM to L wrist and R elbow regions  Manual Therapy Activity 3: AAROM to L wrist flex/ext/sup/pro/RD/UD  Manual Therapy Activity 4: AAROM to R elbow flext/ext/sup/pro       OP EDUCATION:   CONT HEP     Assessment:   Pt participated in therapeutic exercises and activities as needed to increase ROM. Pt tolerated Manual therapy with some reports of tenderness in R elbow.      Plan:  OT Plan: TETA,  Modalities  Duration: 8 weeks  Onset Date: 12/12/24  Certification Period Start Date: 12/12/24  Certification Period End Date: 02/06/25  Rehab Potential: Fair  Plan of Care Agreement: Patient  Goals:  Active       OT Goals       Develop and issue HEP to help maximize ROM, strength and tolerance to help maximize return to all pre-onset activities.        Start:  12/12/24    Expected End:  03/06/25            LTG - Patient will indicate/ demonstrate the ability to resume all preinjury ADLs and IADLs without significant limits secondary to decreased ROM, decreased strength and/or pain as indicated by Quickdash score of less than 20%.        Start:  12/12/24    Expected End:  03/06/25            Patient will demonstrate a progressive increase in ROM as appropriate with BUE to be within 5-10 degrees of age norms to help patient resume normal ADL and IADL function.        Start:  12/12/24    Expected End:  03/06/25            Pain to be less than or equal to 1/10 with greater than or equal to 45 minutes activity.        Start:  12/12/24    Expected End:  03/06/25            Pt will demonstrate increased  strength, when appropriate, with BUE  to be greater than or equal to 80% of the norms to help patient resume ADLs and IADLs.        Start:  12/12/24    Expected End:  03/06/25

## 2025-01-14 ENCOUNTER — DOCUMENTATION (OUTPATIENT)
Dept: OCCUPATIONAL THERAPY | Facility: CLINIC | Age: 45
End: 2025-01-14
Payer: MEDICAID

## 2025-01-14 ENCOUNTER — APPOINTMENT (OUTPATIENT)
Dept: PHYSICAL THERAPY | Facility: CLINIC | Age: 45
End: 2025-01-14
Payer: MEDICAID

## 2025-01-14 ENCOUNTER — APPOINTMENT (OUTPATIENT)
Dept: OCCUPATIONAL THERAPY | Facility: CLINIC | Age: 45
End: 2025-01-14
Payer: MEDICAID

## 2025-01-14 DIAGNOSIS — M79.18 RIGHT BUTTOCK PAIN: Primary | ICD-10-CM

## 2025-01-14 DIAGNOSIS — S32.810D MULTIPLE FRACTURES OF PELVIS WITH STABLE DISRUPTION OF PELVIC RING, SUBSEQUENT ENCOUNTER FOR FRACTURE WITH ROUTINE HEALING: ICD-10-CM

## 2025-01-14 NOTE — PROGRESS NOTES
Occupational Therapy                 Therapy Communication Note    Patient Name: Ga Camp  MRN: [id number]  Department:   Room: Room/bed info not found  Today's Date: 1/14/2025     Discipline: Occupational Therapy          Missed Visit Reason:   Per  patient fell yesterday and is going to the Dr. Tomorrow    Missed Time: Cancel    Comment: This is patients 3rd cancel/NS this POC

## 2025-01-16 ENCOUNTER — APPOINTMENT (OUTPATIENT)
Dept: PHYSICAL THERAPY | Facility: CLINIC | Age: 45
End: 2025-01-16
Payer: MEDICAID

## 2025-01-16 ENCOUNTER — APPOINTMENT (OUTPATIENT)
Dept: OCCUPATIONAL THERAPY | Facility: CLINIC | Age: 45
End: 2025-01-16
Payer: MEDICAID

## 2025-01-16 ENCOUNTER — DOCUMENTATION (OUTPATIENT)
Dept: OCCUPATIONAL THERAPY | Facility: CLINIC | Age: 45
End: 2025-01-16
Payer: MEDICAID

## 2025-01-16 DIAGNOSIS — M79.18 RIGHT BUTTOCK PAIN: Primary | ICD-10-CM

## 2025-01-16 DIAGNOSIS — S32.810D MULTIPLE FRACTURES OF PELVIS WITH STABLE DISRUPTION OF PELVIC RING, SUBSEQUENT ENCOUNTER FOR FRACTURE WITH ROUTINE HEALING: ICD-10-CM

## 2025-01-16 NOTE — PROGRESS NOTES
Physical Therapy                 Therapy Communication Note    Patient Name: Ga Camp  MRN: [id number]  Department:   Room: Room/bed info not found  Today's Date: 1/16/2025     Discipline: Physical Therapy          Missed Visit Reason:      Missed Time: Cancel    Comment: B  
Yes

## 2025-01-16 NOTE — PROGRESS NOTES
Occupational Therapy                 Therapy Communication Note    Patient Name: Ga Camp  MRN: [id number]  Today's Date: 1/16/2025     Discipline: Occupational Therapy    Missed Visit Reason:  weather     Missed Time: Cancel

## 2025-01-21 ENCOUNTER — TREATMENT (OUTPATIENT)
Dept: PHYSICAL THERAPY | Facility: CLINIC | Age: 45
End: 2025-01-21
Payer: MEDICAID

## 2025-01-21 ENCOUNTER — TREATMENT (OUTPATIENT)
Dept: OCCUPATIONAL THERAPY | Facility: CLINIC | Age: 45
End: 2025-01-21
Payer: MEDICAID

## 2025-01-21 DIAGNOSIS — S42.401A CLOSED FRACTURE DISLOCATION OF RIGHT ELBOW: ICD-10-CM

## 2025-01-21 DIAGNOSIS — S62.102A LEFT WRIST FRACTURE: ICD-10-CM

## 2025-01-21 DIAGNOSIS — S32.810D MULTIPLE FRACTURES OF PELVIS WITH STABLE DISRUPTION OF PELVIC RING, SUBSEQUENT ENCOUNTER FOR FRACTURE WITH ROUTINE HEALING: ICD-10-CM

## 2025-01-21 DIAGNOSIS — M79.18 RIGHT BUTTOCK PAIN: Primary | ICD-10-CM

## 2025-01-21 PROCEDURE — 97530 THERAPEUTIC ACTIVITIES: CPT | Mod: GO

## 2025-01-21 PROCEDURE — 97110 THERAPEUTIC EXERCISES: CPT | Mod: GP,CQ

## 2025-01-21 PROCEDURE — 97140 MANUAL THERAPY 1/> REGIONS: CPT | Mod: GO

## 2025-01-21 ASSESSMENT — PAIN - FUNCTIONAL ASSESSMENT
PAIN_FUNCTIONAL_ASSESSMENT: 0-10
PAIN_FUNCTIONAL_ASSESSMENT: 0-10

## 2025-01-21 ASSESSMENT — PAIN SCALES - GENERAL
PAINLEVEL_OUTOF10: 6
PAINLEVEL_OUTOF10: 6
PAINLEVEL_OUTOF10: 9

## 2025-01-21 ASSESSMENT — PAIN DESCRIPTION - DESCRIPTORS: DESCRIPTORS: ACHING

## 2025-01-21 NOTE — PROGRESS NOTES
Physical Therapy Treatment    Patient Name: Ga Camp  MRN: [id number]  Today's Date: 1/21/2025  Time Calculation  Start Time: 1045  Stop Time: 1128  Time Calculation (min): 43 min  PT Therapeutic Procedures Time Entry  Therapeutic Exercise Time Entry: 41       Assessment:   Patient challenged with step ups demo'g slow controlled motions throughout reps. Patient able to tolerate new PRE's with mild challenges. Patient demo's good form throughout session. Demo's no c/o increased symptoms pre and post session.     Plan:  Continue to work on improving strength and decreasing pain to be able to tolerate prolonged ambulation with little to no difficulty. -AB.     OP PT Plan  Treatment/Interventions: Aquatic therapy, Cryotherapy, Dry needling, Education/ Instruction, Electrical stimulation, Gait training, Hot pack, Manual therapy, Self care/ home management, Therapeutic exercises, Other (comment) (IASTM/cupping)  PT Plan: Skilled PT  PT Frequency: 2 times per week  Duration: 4wks  Onset Date: 11/13/24  Rehab Potential: Good  Plan of Care Agreement: Patient    Current Problem  Problem List Items Addressed This Visit             ICD-10-CM    Multiple fractures of pelvis with stable disruption of pelvic ring, subsequent encounter for fracture with routine healing S32.810D    Right buttock pain - Primary M79.18       Subjective   General  Reason for Referral: R buttock pain  Referred By: Adrian  Past Medical History Relevant to Rehab: HX pelvic FX/surgery 11/12/24  General Comment: 2/9  Visit: 2  HEP: Yes  Patient states that he fell last week right before the follow up with the dr. States that there was nothing broken. States that he is compliant with his HEP.     Precautions  Precautions  Precautions Comment: none    Pain  Pain Assessment: 0-10  0-10 (Numeric) Pain Score: 6  Pain Location: Pelvis  Pain Orientation: Right    Objective     Treatments:  Therapeutic Exercise: 41 minutes, 3 units  SciFit x6' (N)  Slantboard  "2x1' (N)  Step ups 6\" step Fwd/Lateral 2x10 Bilat (N)  Hooklying clamshell x15 kosta (P, reps)   Hooklying marches 2x10 Blue Tband (N)  Hooklying R SLR x15 (P, reps)   Hooklying bridge x10  Hooklying hip adduction x15 5\" hold (N)    POC:  Ongoing clinic PT to include:continue with open to closed chain ROM/PRE and gait/balance work as sujata;  also add core work as sujata; please avoid undue hip/LB sx exacerbation    OP EDUCATION:   Hooklying clamshell   Hooklyig R SLR  Hooklying bridge    Goals:  Active       PT Problem       PT Goal 1       Start:  01/09/25    Expected End:  04/09/25       1. Independent HEP to allow for 50% reduction in max ADL C/C sx ( 5/10)_ 2-3wks  2. Survey score improvement from 43/80 to 55/80 (LEFS) 3-4wks  3. Strength increase to allow for improved ADL walking (from 4, 4+/5 to 5/5 R hip) 3-4wks         PT Goal 2       Start:  01/09/25    Expected End:  04/09/25       1. \"I want my  butt/hip  to feel better\".             "

## 2025-01-21 NOTE — PROGRESS NOTES
Occupational Therapy    OT Treatment    Patient Name: Ga Camp  MRN: [id number]  Today's Date: 1/21/2025     Time Calculation  Start Time: 0915  Stop Time: 1000  Time Calculation (min): 45 min    Visit Number: 8  Therapeutic Procedures:      OT Therapeutic Procedures Time Entry  Manual Therapy Time Entry: 30  Therapeutic Activity Time Entry: 15                         Current Problem:  1. Left wrist fracture  Follow Up In Occupational Therapy      2. Closed fracture dislocation of right elbow  Follow Up In Occupational Therapy          Subjective     General:Pt reported that he fell while moving out of his house (1/10/2025), pt went to ortho after fall and got x-rays. Pt reported everything looks good, pt is still NWB and may be for 2 months per pt reported. Pt will go back in 2 months to ortho. Pt reported that he had increase pain since fall.      Reason for Referral: 1. Left wrist fracture  Referral to Occupational Therapy    2. Closed fracture dislocation of right elbow  Referred By: Adrian Eid Comment: Visit 6  Pain:  Pain Assessment  Pain Assessment: 0-10  0-10 (Numeric) Pain Score: 6  Pain Type: Acute pain  Pain Location: Wrist  Pain Orientation: Left  Pain Descriptors: Aching  Multiple Pain Sites: Two  Pain 2  Pain Score 2: 9  Pain Location 2: Elbow  Pain Orientation 2: Right    Objective       Therapy/Activity:   Modalities  Modalities Used: Yes  Modality 1: Untimed Hotpack  Therapeutic Activity  Therapeutic Activity 1: PROM over ball with 2 lbs 1 min hold x 3sets  Manual Therapy  Manual Therapy Activity 1: AAROM to R elbow flext/ext/sup/pro  Manual Therapy Activity 2: Cupping on L wrist  Manual Therapy Activity 3: STM to L wrist and R elbow regions           OP EDUCATION:   CONT HEP. Pt educated to not push past pain, pt indicated understanding.      Assessment:   Pt participated in therapeutic exercises and activities as needed to increase ROM and strength. Pt tolerated gentle cupping on L wrist.  Pt reported increase pain in R elbow, no manual was done on RUE. Pt completed ROM activities, with signs of pain, pt reported that its just sore.      Plan:  OT Plan: TE,TA, Modalities  Duration: 8 weeks  Onset Date: 12/12/24  Certification Period Start Date: 12/12/24  Certification Period End Date: 02/06/25  Rehab Potential: Fair  Plan of Care Agreement: Patient  Goals:  Active       OT Goals       Develop and issue HEP to help maximize ROM, strength and tolerance to help maximize return to all pre-onset activities.        Start:  12/12/24    Expected End:  03/06/25            LTG - Patient will indicate/ demonstrate the ability to resume all preinjury ADLs and IADLs without significant limits secondary to decreased ROM, decreased strength and/or pain as indicated by Quickdash score of less than 20%.        Start:  12/12/24    Expected End:  03/06/25            Patient will demonstrate a progressive increase in ROM as appropriate with BUE to be within 5-10 degrees of age norms to help patient resume normal ADL and IADL function.        Start:  12/12/24    Expected End:  03/06/25            Pain to be less than or equal to 1/10 with greater than or equal to 45 minutes activity.        Start:  12/12/24    Expected End:  03/06/25            Pt will demonstrate increased  strength, when appropriate, with BUE  to be greater than or equal to 80% of the norms to help patient resume ADLs and IADLs.        Start:  12/12/24    Expected End:  03/06/25

## 2025-01-23 ENCOUNTER — TREATMENT (OUTPATIENT)
Dept: PHYSICAL THERAPY | Facility: CLINIC | Age: 45
End: 2025-01-23
Payer: MEDICAID

## 2025-01-23 ENCOUNTER — TREATMENT (OUTPATIENT)
Dept: OCCUPATIONAL THERAPY | Facility: CLINIC | Age: 45
End: 2025-01-23
Payer: MEDICAID

## 2025-01-23 DIAGNOSIS — M79.18 RIGHT BUTTOCK PAIN: Primary | ICD-10-CM

## 2025-01-23 DIAGNOSIS — S42.401D CLOSED FRACTURE DISLOCATION OF RIGHT ELBOW WITH ROUTINE HEALING, SUBSEQUENT ENCOUNTER: Primary | ICD-10-CM

## 2025-01-23 DIAGNOSIS — S32.810D MULTIPLE FRACTURES OF PELVIS WITH STABLE DISRUPTION OF PELVIC RING, SUBSEQUENT ENCOUNTER FOR FRACTURE WITH ROUTINE HEALING: ICD-10-CM

## 2025-01-23 DIAGNOSIS — S42.401A CLOSED FRACTURE DISLOCATION OF RIGHT ELBOW: ICD-10-CM

## 2025-01-23 DIAGNOSIS — S62.102A LEFT WRIST FRACTURE: ICD-10-CM

## 2025-01-23 PROCEDURE — 97140 MANUAL THERAPY 1/> REGIONS: CPT | Mod: GO,CO

## 2025-01-23 PROCEDURE — 97110 THERAPEUTIC EXERCISES: CPT | Mod: GP

## 2025-01-23 PROCEDURE — 97530 THERAPEUTIC ACTIVITIES: CPT | Mod: GO,CO

## 2025-01-23 ASSESSMENT — PAIN SCALES - GENERAL
PAINLEVEL_OUTOF10: 3
PAINLEVEL_OUTOF10: 5 - MODERATE PAIN

## 2025-01-23 ASSESSMENT — PAIN DESCRIPTION - DESCRIPTORS
DESCRIPTORS: ACHING
DESCRIPTORS: ACHING

## 2025-01-23 ASSESSMENT — PAIN - FUNCTIONAL ASSESSMENT
PAIN_FUNCTIONAL_ASSESSMENT: 0-10
PAIN_FUNCTIONAL_ASSESSMENT: 0-10

## 2025-01-23 NOTE — PROGRESS NOTES
"Physical Therapy Treatment    Patient Name: Ga Camp  MRN: [id number]  : 1980  Today's Date: 2025  Kya Campbell  Time Calculation  Start Time: 1035                                General:  General  Reason for Referral: R buttock pain  Referred By: Adrian  Past Medical History Relevant to Rehab: HX pelvic FX/surgery 26  General Comment: 3/9  Visit #3    Current Problem  Problem List Items Addressed This Visit             ICD-10-CM    Multiple fractures of pelvis with stable disruption of pelvic ring, subsequent encounter for fracture with routine healing S32.810D    Right buttock pain - Primary M79.18         Assessment:Patient identity confirmed today with name/. ;  ( 1 ) falls since last clinic visit-slipped on ice but I had x-rays that are negative;  modified/added to clinic/HEP today;       Plan:  Treatment/Interventions: Aquatic therapy, Cryotherapy, Dry needling, Education/ Instruction, Electrical stimulation, Gait training, Hot pack, Manual therapy, Self care/ home management, Therapeutic exercises, Other (comment) (IASTM/cupping)  PT Plan: Skilled PT  PT Frequency: 2 times per week  Duration: 4wks  Onset Date: 24  Rehab Potential: Good  Plan of Care Agreement: Patient  Continue with clinic rehab treatments toward functional goals ( gait);     Subjective: I have   4/10 pain right now.   I am feeling better since I slipped and fell.      Precautions  Precautions  Precautions Comment: none      Pain  Pain Assessment: 0-10  Pain Location: Pelvis  Pain Orientation: Right    Objective    Manual:_____min    There ex:_____min  SciFit x6'   Slantboard 1'  Step ups 6\" step Fwd/Lateral 2x10 R)  Steam boats LLE 2x10 ea orange N  Hooklying clamshell x15 purple (P)   LSL R hip abd 2x10 N  Hooklying R SLR 2x10 (P)   Hooklying bridge 2x10 P  Hooklying hip adduction x15 5\" hold   Hooklying bridge with marching-A  Stdg R hip abd suzy(at wall) with mini-squat-A  Pirif stretch-? -A only as " "tolerated  Hooklying marches 2x10 Blue Tband X    POC:  Ongoing clinic PT to include:continue with open to closed chain ROM/PRE and gait/balance work as sujata;  also add core work as sujata; please avoid undue hip/LB sx exacerbation    OP EDUCATION:   Hooklying fermín Costa R SLR  Hooklying bridge    OP EDUCATION:  Access Code: [email or web address]  URL: [email or web address]  Date: 01/23/2025  Prepared by: Chun Cardenas    Exercises  - Sidelying Hip Abduction (Mirrored)   - Standing Repeated Hip Abduction with Resistance   - Standing Repeated Hip Extension with Resistance   - Standing Repeated Hip Flexion with Resistance (Mirrored)     Goals:  Active       PT Problem       PT Goal 1       Start:  01/09/25    Expected End:  04/09/25       1. Independent HEP to allow for 50% reduction in max ADL C/C sx ( 5/10)_ 2-3wks  2. Survey score improvement from 43/80 to 55/80 (LEFS) 3-4wks  3. Strength increase to allow for improved ADL walking (from 4, 4+/5 to 5/5 R hip) 3-4wks         PT Goal 2       Start:  01/09/25    Expected End:  04/09/25       1. \"I want my  butt/hip  to feel better\".             "

## 2025-01-23 NOTE — PROGRESS NOTES
"Occupational Therapy    Occupational Therapy Treatment    Name: Ga Camp  MRN: [id number]  : 1980  Date: 25    Time Entry:  Time Calculation  Start Time: 945  Stop Time:   Time Calculation (min): 45 min        OT Therapeutic Procedures Time Entry  Manual Therapy Time Entry:   Therapeutic Activity Time Entry: 20                Assessment: Patient tolerating manual well, some fatigue reported with activities however able to complete.     Plan: Continue with current POC towards OT goals  OT Plan: TE,TA, Modalities  Duration: 8 weeks  Onset Date: 24  Certification Period Start Date: 24  Certification Period End Date: 25  Rehab Potential: Fair  Plan of Care Agreement: Patient    Subjective   General: Patient states he is doing okay, states he is still sore from his fall. States he was told by his Dr. \"No weight bearing until next appointment in march\" OT prior to PT.    General  Reason for Referral: 1. Left wrist fracture  Referral to Occupational Therapy    2. Closed fracture dislocation of right elbow  Referred By: Adrian  General Comment: Visit 7  Precautions:  Precautions Comment: non WB for R elbow and L wrist (until at least ); Metal in BUE  Pain Assessment:  Pain Assessment  Pain Assessment: 0-10  0-10 (Numeric) Pain Score: 3  Pain Type: Acute pain  Pain Location: Wrist  Pain Orientation: Left  Pain Descriptors: Aching  Pain 2  Pain Score 2: 5 - Moderate pain  Pain Location 2: Elbow  Pain Orientation 2: Right  Pain Descriptors 2: Aching  Clinical Progression 2: Gradually improving    Objective      Modalities:  Modalities Used: Yes  Modality 1: Untimed Hotpack    Therapy/Activity:  Therapeutic Activity  Therapeutic Activity Performed: Yes  Therapeutic Activity 1: Patient interview with hot pack donned  Therapeutic Activity 2: B hands stacking/unstacking cones sup/pro x 10 cones x 3 trials  Therapeutic Activity 3: B hands gently pushing into ball for wrist " extension x 10 w/ 10 second holds    Manual Therapy  Manual Therapy Performed: Yes  Manual Therapy Activity 1: AAROM to R elbow flext/ext/sup/pro  Manual Therapy Activity 2: AAROM to L wrist flex/ext/sup/pro/RD/UD  Manual Therapy Activity 3: STM to L wrist and R elbow regions  Manual Therapy Activity 4: Cupping to dorsal L wrist to promote healing and increase ROM  Manual Therapy Activity 5: Cupping to R elbow region to promote healing and increase ROM      Goals:  Active       OT Goals       Develop and issue HEP to help maximize ROM, strength and tolerance to help maximize return to all pre-onset activities.        Start:  12/12/24    Expected End:  03/06/25            LTG - Patient will indicate/ demonstrate the ability to resume all preinjury ADLs and IADLs without significant limits secondary to decreased ROM, decreased strength and/or pain as indicated by Quickdash score of less than 20%.        Start:  12/12/24    Expected End:  03/06/25            Patient will demonstrate a progressive increase in ROM as appropriate with BUE to be within 5-10 degrees of age norms to help patient resume normal ADL and IADL function.        Start:  12/12/24    Expected End:  03/06/25            Pain to be less than or equal to 1/10 with greater than or equal to 45 minutes activity.        Start:  12/12/24    Expected End:  03/06/25            Pt will demonstrate increased  strength, when appropriate, with BUE  to be greater than or equal to 80% of the norms to help patient resume ADLs and IADLs.        Start:  12/12/24    Expected End:  03/06/25

## 2025-01-28 ENCOUNTER — TREATMENT (OUTPATIENT)
Dept: PHYSICAL THERAPY | Facility: CLINIC | Age: 45
End: 2025-01-28
Payer: MEDICAID

## 2025-01-28 ENCOUNTER — TREATMENT (OUTPATIENT)
Dept: OCCUPATIONAL THERAPY | Facility: CLINIC | Age: 45
End: 2025-01-28
Payer: MEDICAID

## 2025-01-28 DIAGNOSIS — S42.401D CLOSED FRACTURE DISLOCATION OF RIGHT ELBOW WITH ROUTINE HEALING, SUBSEQUENT ENCOUNTER: ICD-10-CM

## 2025-01-28 DIAGNOSIS — S32.810D MULTIPLE FRACTURES OF PELVIS WITH STABLE DISRUPTION OF PELVIC RING, SUBSEQUENT ENCOUNTER FOR FRACTURE WITH ROUTINE HEALING: ICD-10-CM

## 2025-01-28 DIAGNOSIS — S62.102D CLOSED FRACTURE OF LEFT WRIST WITH ROUTINE HEALING, SUBSEQUENT ENCOUNTER: Primary | ICD-10-CM

## 2025-01-28 DIAGNOSIS — S42.401A CLOSED FRACTURE DISLOCATION OF RIGHT ELBOW: ICD-10-CM

## 2025-01-28 DIAGNOSIS — M79.18 RIGHT BUTTOCK PAIN: Primary | ICD-10-CM

## 2025-01-28 DIAGNOSIS — S62.102A LEFT WRIST FRACTURE: ICD-10-CM

## 2025-01-28 PROCEDURE — 97140 MANUAL THERAPY 1/> REGIONS: CPT | Mod: GO,CO

## 2025-01-28 PROCEDURE — 97110 THERAPEUTIC EXERCISES: CPT | Mod: GP,CQ

## 2025-01-28 ASSESSMENT — PAIN - FUNCTIONAL ASSESSMENT
PAIN_FUNCTIONAL_ASSESSMENT: 0-10
PAIN_FUNCTIONAL_ASSESSMENT: 0-10

## 2025-01-28 ASSESSMENT — PAIN SCALES - GENERAL
PAINLEVEL_OUTOF10: 2
PAINLEVEL_OUTOF10: 3
PAINLEVEL_OUTOF10: 4

## 2025-01-28 ASSESSMENT — PAIN DESCRIPTION - DESCRIPTORS
DESCRIPTORS: ACHING
DESCRIPTORS: ACHING

## 2025-01-28 NOTE — PROGRESS NOTES
Occupational Therapy    Occupational Therapy Treatment    Name: Ga Camp  MRN: [id number]  : 1980  Date: 25    Time Entry:  Time Calculation  Start Time: 1129  Stop Time: 1212  Time Calculation (min): 43 min        OT Therapeutic Procedures Time Entry  Manual Therapy Time Entry: 43                Assessment: Some increase pain reported with flex/ext and RD only of L wrist and flex/ext of R elbow however tolerable. Demos understanding to continue with stretching and massage at home for BUE.     Plan: Continue with current POC towards OT goals, Begin PROM  OT Plan: TE,TA, Modalities  Frequency: 2x/week  Duration: 8 weeks  Onset Date: 24  Certification Period Start Date: 24  Certification Period End Date: 25  Rehab Potential: Fair  Plan of Care Agreement: Patient    Subjective   General: Patient states he is doing well, Patient has been cleared by  For WBAT on BUE and able to begin progressive PROM to L wrist and R elbow.    General  Reason for Referral: 1. Left wrist fracture  Referral to Occupational Therapy    2. Closed fracture dislocation of right elbow  Referred By: Adrian  General Comment: Visit 8  Precautions:  Precautions Comment: WBAT in BUE  Pain Assessment:  Pain Assessment  Pain Assessment: 0-10  0-10 (Numeric) Pain Score: 2  Pain Location: Wrist  Pain Orientation: Left  Pain Descriptors: Aching  Clinical Progression: Gradually improving  Pain 2  Pain Score 2: 3  Pain Location 2: Elbow  Pain Orientation 2: Right  Pain Descriptors 2: Aching  Clinical Progression 2: Gradually improving    Objective      Modalities:  Modalities Used: Yes  Modality 1: Untimed Hotpack    Therapy/Activity:   Manual Therapy  Manual Therapy Performed: Yes  Manual Therapy Activity 1: Patient interview with hot pack donned  Manual Therapy Activity 2: Cupping to Dorsal and radial regions of L wrist  Manual Therapy Activity 3: Cupping to R elbow region to promote healing and increase ROM  Manual  Therapy Activity 4: STM to L wrist and R elbow  Manual Therapy Activity 5: PROM to L wrist flex/ext/sup/pro/RD/UD x 5 w/ 10 second holds each (PROM to R elbow flex/ext x 10 w/ 10 second holds each)      Goals:  Active       OT Goals       Develop and issue HEP to help maximize ROM, strength and tolerance to help maximize return to all pre-onset activities.        Start:  12/12/24    Expected End:  03/06/25            LTG - Patient will indicate/ demonstrate the ability to resume all preinjury ADLs and IADLs without significant limits secondary to decreased ROM, decreased strength and/or pain as indicated by Quickdash score of less than 20%.        Start:  12/12/24    Expected End:  03/06/25            Patient will demonstrate a progressive increase in ROM as appropriate with BUE to be within 5-10 degrees of age norms to help patient resume normal ADL and IADL function.        Start:  12/12/24    Expected End:  03/06/25            Pain to be less than or equal to 1/10 with greater than or equal to 45 minutes activity.        Start:  12/12/24    Expected End:  03/06/25            Pt will demonstrate increased  strength, when appropriate, with BUE  to be greater than or equal to 80% of the norms to help patient resume ADLs and IADLs.        Start:  12/12/24    Expected End:  03/06/25

## 2025-01-28 NOTE — PROGRESS NOTES
"Physical Therapy Treatment    Patient Name: Ga Camp  MRN: [id number]  Today's Date: 1/28/2025  Time Calculation  Start Time: 1045  Stop Time: 1127  Time Calculation (min): 42 min  PT Therapeutic Procedures Time Entry  Therapeutic Exercise Time Entry: 40       Assessment:   Patient able to tolerate session with mild challenges. Able to incorporate new stability type PRE's with no exacerbation of symptoms. Patient demo's fatigue at end of session.     Plan:  Continue to work on improving strength and decreasing pain to be able to tolerate normal work duties with little to no difficulty. -AB.     OP PT Plan  Treatment/Interventions: Aquatic therapy, Cryotherapy, Dry needling, Education/ Instruction, Electrical stimulation, Gait training, Hot pack, Manual therapy, Self care/ home management, Therapeutic exercises, Other (comment) (IASTM/cupping)  PT Plan: Skilled PT  PT Frequency: 2 times per week  Duration: 4wks  Onset Date: 11/13/24  Rehab Potential: Good  Plan of Care Agreement: Patient    Current Problem  Problem List Items Addressed This Visit             ICD-10-CM    Multiple fractures of pelvis with stable disruption of pelvic ring, subsequent encounter for fracture with routine healing S32.810D    Right buttock pain - Primary M79.18       Subjective   General  Reason for Referral: R buttock pain  Referred By: Adrian  Past Medical History Relevant to Rehab: HX pelvic FX/surgery 11/12/24  General Comment: 4/9  Visit: 4  HEP: Yes  Patient states that he is doing his HEP.     Precautions  Precautions  Precautions Comment: none    Pain  Pain Assessment: 0-10  0-10 (Numeric) Pain Score: 4  Pain Location: Pelvis  Pain Orientation: Right, Left    Objective     Treatments:  Therapeutic Exercise: 40 minutes, 3 units  SciFit x6'   Slantboard 2x1'  Step ups 6\" step Fwd/Lateral 2x10   Steam boats LLE 2x10 ea orange  Hooklying clamshell x15 purple (P)   LSL R hip abd 2x10 N  Hooklying R SLR 2x10 (P)   Hooklying bridge " "2x10 P  Hooklying hip adduction 2x10 5\" hold (P, reps)   Hooklying bridge with marching x10 (N)  Stdg R hip abd suzy(at wall) with mini-squat x10 (N)  Pirif stretch-? -A only as tolerated  Hooklying marches 2x10 Blue Tband X    POC:  Ongoing clinic PT to include:continue with open to closed chain ROM/PRE and gait/balance work as sujata;  also add core work as sujata; please avoid undue hip/LB sx exacerbation    OP EDUCATION:   Access Code: [email or web address]  URL: [email or web address]  Date: 01/23/2025  Prepared by: Chun Cardenas    Exercises  - Sidelying Hip Abduction (Mirrored)   - Standing Repeated Hip Abduction with Resistance   - Standing Repeated Hip Extension with Resistance   - Standing Repeated Hip Flexion with Resistance (Mirrored)     Goals:  Active       PT Problem       PT Goal 1       Start:  01/09/25    Expected End:  04/09/25       1. Independent HEP to allow for 50% reduction in max ADL C/C sx ( 5/10)_ 2-3wks  2. Survey score improvement from 43/80 to 55/80 (LEFS) 3-4wks  3. Strength increase to allow for improved ADL walking (from 4, 4+/5 to 5/5 R hip) 3-4wks         PT Goal 2       Start:  01/09/25    Expected End:  04/09/25       1. \"I want my  butt/hip  to feel better\".             "

## 2025-01-30 ENCOUNTER — TREATMENT (OUTPATIENT)
Dept: OCCUPATIONAL THERAPY | Facility: CLINIC | Age: 45
End: 2025-01-30
Payer: MEDICAID

## 2025-01-30 ENCOUNTER — TREATMENT (OUTPATIENT)
Dept: PHYSICAL THERAPY | Facility: CLINIC | Age: 45
End: 2025-01-30
Payer: MEDICAID

## 2025-01-30 DIAGNOSIS — S42.401D CLOSED FRACTURE DISLOCATION OF RIGHT ELBOW WITH ROUTINE HEALING, SUBSEQUENT ENCOUNTER: ICD-10-CM

## 2025-01-30 DIAGNOSIS — S32.810D MULTIPLE FRACTURES OF PELVIS WITH STABLE DISRUPTION OF PELVIC RING, SUBSEQUENT ENCOUNTER FOR FRACTURE WITH ROUTINE HEALING: ICD-10-CM

## 2025-01-30 DIAGNOSIS — M79.18 RIGHT BUTTOCK PAIN: Primary | ICD-10-CM

## 2025-01-30 DIAGNOSIS — S62.102A LEFT WRIST FRACTURE: ICD-10-CM

## 2025-01-30 DIAGNOSIS — S62.102D CLOSED FRACTURE OF LEFT WRIST WITH ROUTINE HEALING, SUBSEQUENT ENCOUNTER: Primary | ICD-10-CM

## 2025-01-30 DIAGNOSIS — S42.401A CLOSED FRACTURE DISLOCATION OF RIGHT ELBOW: ICD-10-CM

## 2025-01-30 PROCEDURE — 97110 THERAPEUTIC EXERCISES: CPT | Mod: GP

## 2025-01-30 PROCEDURE — 97530 THERAPEUTIC ACTIVITIES: CPT | Mod: GO,CO

## 2025-01-30 PROCEDURE — 97140 MANUAL THERAPY 1/> REGIONS: CPT | Mod: GO,CO

## 2025-01-30 ASSESSMENT — PAIN - FUNCTIONAL ASSESSMENT
PAIN_FUNCTIONAL_ASSESSMENT: 0-10
PAIN_FUNCTIONAL_ASSESSMENT: 0-10

## 2025-01-30 ASSESSMENT — PAIN DESCRIPTION - DESCRIPTORS
DESCRIPTORS: ACHING
DESCRIPTORS: ACHING

## 2025-01-30 ASSESSMENT — PAIN SCALES - GENERAL
PAINLEVEL_OUTOF10: 2
PAINLEVEL_OUTOF10: 3
PAINLEVEL_OUTOF10: 3

## 2025-01-30 NOTE — PROGRESS NOTES
"Occupational Therapy    Occupational Therapy Treatment    Name: Ga Camp  MRN: [id number]  : 1980  Date: 25    Time Entry:  Time Calculation  Start Time: 1000  Stop Time: 1045  Time Calculation (min): 45 min        OT Therapeutic Procedures Time Entry  Manual Therapy Time Entry: 35  Therapeutic Activity Time Entry: 10                Assessment: Increased pain reported with PROM however patient states he is able to tolerate it. Upon palpation of RUE, knot was felt along distal end of scar region, patient educated to call  To see if he needs to be seen sooner. Zuleika understanding of continuing exercises and massage at home.    Plan: Continue with current POC towards OT goals  OT Plan: TE,TA, Modalities  Duration: 8 weeks  Onset Date: 24  Certification Period Start Date: 24  Certification Period End Date: 25  Rehab Potential: Fair  Plan of Care Agreement: Patient    Subjective   General: Patient states he is doing okay, states he was a little sore after last session. States he has a \"knot\" over L elbow scar region that has been super \"tender\"     General  Reason for Referral: 1. Left wrist fracture  Referral to Occupational Therapy    2. Closed fracture dislocation of right elbow  Referred By: Adrian  General Comment: Visit 9  Precautions:  Precautions Comment: WBAT in BUE  Pain Assessment:  Pain Assessment  Pain Assessment: 0-10  0-10 (Numeric) Pain Score: 2  Pain Location: Wrist  Pain Orientation: Left  Pain Descriptors: Aching  Clinical Progression: Gradually improving  Pain 2  Pain Score 2: 3  Pain Location 2: Elbow  Pain Orientation 2: Right  Pain Descriptors 2: Aching  Clinical Progression 2: Gradually improving    Objective      Modalities:  Modalities Used: Yes  Modality 1: Untimed Hotpack, Untimed Other: (comment) (Paraffin donned to L wrist, Hot pack donned to R elbow)    Therapy/Activity:   Therapeutic Activity  Therapeutic Activity Performed: Yes  Therapeutic Activity " 1: BUE AROM over bolster flex/ext x 10 each  Therapeutic Activity 2: Education on continuing massage and exercises    Manual Therapy  Manual Therapy Performed: Yes  Manual Therapy Activity 1: PROM to L wrist flex/ext/sup/pro/RD/UD x 5 w/ 10 second holds each  Manual Therapy Activity 2: PROM to R elbow flex/ext x 5 w/ 10 second holds each  Manual Therapy Activity 3: IASTM HG9 and HG6 to L wrist and R elbow/forearm regions  Manual Therapy Activity 4: STM to L wrist and R elbow regions      Goals:  Active       OT Goals       Develop and issue HEP to help maximize ROM, strength and tolerance to help maximize return to all pre-onset activities.        Start:  12/12/24    Expected End:  03/06/25            LTG - Patient will indicate/ demonstrate the ability to resume all preinjury ADLs and IADLs without significant limits secondary to decreased ROM, decreased strength and/or pain as indicated by Quickdash score of less than 20%.        Start:  12/12/24    Expected End:  03/06/25            Patient will demonstrate a progressive increase in ROM as appropriate with BUE to be within 5-10 degrees of age norms to help patient resume normal ADL and IADL function.        Start:  12/12/24    Expected End:  03/06/25            Pain to be less than or equal to 1/10 with greater than or equal to 45 minutes activity.        Start:  12/12/24    Expected End:  03/06/25            Pt will demonstrate increased  strength, when appropriate, with BUE  to be greater than or equal to 80% of the norms to help patient resume ADLs and IADLs.        Start:  12/12/24    Expected End:  03/06/25

## 2025-01-30 NOTE — PROGRESS NOTES
"Physical Therapy Treatment    Patient Name: Ga Camp  MRN: [id number]  : 1980  Today's Date: 2025  Kya Campbell  Time Calculation  Start Time: 1046  Stop Time: 1121  Time Calculation (min): 35 min      PT Therapeutic Procedures Time Entry  Therapeutic Exercise Time Entry: 35                         General:  General  Reason for Referral: R buttock pain  Referred By: Adrian  Past Medical History Relevant to Rehab: HX pelvic FX/surgery 26  General Comment:   Visit #5    Current Problem  Problem List Items Addressed This Visit             ICD-10-CM    Multiple fractures of pelvis with stable disruption of pelvic ring, subsequent encounter for fracture with routine healing S32.810D    Right buttock pain - Primary M79.18         Assessment:Patient identity confirmed today with name/. ;  ( 0 ) falls since last clinic visit;  much of the ongoing C/C sx are at the R sacral area; modified/added to clinic/HEP today;       Plan:  Treatment/Interventions: Aquatic therapy, Cryotherapy, Dry needling, Education/ Instruction, Electrical stimulation, Gait training, Hot pack, Manual therapy, Self care/ home management, Therapeutic exercises, Other (comment) (IASTM/cupping)  PT Plan: Skilled PT  PT Frequency: 2 times per week  Duration: 4wks  Onset Date: 24  Rehab Potential: Good  Plan of Care Agreement: Patient  Continue with clinic rehab treatments toward functional goals ( gait);  the patient agreed to CA next clinic visit to trial HEP;  will see here     Subjective: I have   3/10 pain right now.   It will hurt if I twist \"wrong\" on it.      Precautions  Precautions  Precautions Comment: none      Pain  Pain Assessment: 0-10  0-10 (Numeric) Pain Score: 3  Pain Location: Pelvis  Pain Orientation: Right, Left    Objective    Manual:___0__min    There ex:_35____min  SciFit x3'   BHR 2x10 N  Slantboard x1'  Step ups 6\" step Fwd/Lateral 2x10   Steam boats LLE 2x10 ea orange  Hip ext OET x10  " "(on forearms) N  Hooklying clamshell 2x10 purple (P)   LSL R hip abd 2x10   Hooklying R SLR 2x10    Hooklying bridge 2x10   Hooklying hip adduction 2x10 5\" hold  Hooklying bridge with marching x10   Stdg R hip abd suzy(at wall) with mini-squat x10 N  Pirif stretch-? -A only as tolerated    POC:  Ongoing clinic PT to include:continue with open to closed chain ROM/PRE and gait/balance work as sujata;  also add core work as sujata; please avoid undue hip/LB sx exacerbation    OP EDUCATION:   Access Code: [email or web address]  URL: [email or web address]  Date: 01/23/2025  Prepared by: Chun Cardenas    Exercises  - Sidelying Hip Abduction (Mirrored)   - Standing Repeated Hip Abduction with Resistance   - Standing Repeated Hip Extension with Resistance   - Standing Repeated Hip Flexion with Resistance (Mirrored)     OP EDUCATION:  Access Code: [email or web address]  URL: [email or web address]  Date: 01/30/2025  Prepared by: Chun Cardenas    Exercises  - Plank with Hip Extension   - Isometric Gluteus Medius at Wall    - Marching Bridge       Goals:  Active       PT Problem       PT Goal 1       Start:  01/09/25    Expected End:  04/09/25       1. Independent HEP to allow for 50% reduction in max ADL C/C sx ( 5/10)_ 2-3wks 4/10 max sx within the last 5 days; 1/30/25; PARTIALLY MET   2. Survey score improvement from 43/80 to 55/80 (LEFS) 3-4wks  3. Strength increase to allow for improved ADL walking (from 4, 4+/5 to 5/5 R hip) 3-4wks         PT Goal 2       Start:  01/09/25    Expected End:  04/09/25       1. \"I want my  butt/hip  to feel better\".             "

## 2025-02-04 ENCOUNTER — APPOINTMENT (OUTPATIENT)
Dept: OCCUPATIONAL THERAPY | Facility: CLINIC | Age: 45
End: 2025-02-04
Payer: MEDICAID

## 2025-02-04 ENCOUNTER — APPOINTMENT (OUTPATIENT)
Dept: PHYSICAL THERAPY | Facility: CLINIC | Age: 45
End: 2025-02-04
Payer: MEDICAID

## 2025-02-04 ENCOUNTER — DOCUMENTATION (OUTPATIENT)
Dept: OCCUPATIONAL THERAPY | Facility: CLINIC | Age: 45
End: 2025-02-04
Payer: MEDICAID

## 2025-02-04 DIAGNOSIS — M79.18 RIGHT BUTTOCK PAIN: Primary | ICD-10-CM

## 2025-02-04 DIAGNOSIS — S32.810D MULTIPLE FRACTURES OF PELVIS WITH STABLE DISRUPTION OF PELVIC RING, SUBSEQUENT ENCOUNTER FOR FRACTURE WITH ROUTINE HEALING: ICD-10-CM

## 2025-02-04 NOTE — PROGRESS NOTES
Occupational Therapy                 Therapy Communication Note    Patient Name: Ga Camp  MRN: [id number]  Department:   Room: Room/bed info not found  Today's Date: 2/4/2025     Discipline: Occupational Therapy          Missed Visit Reason:   Patient is ill    Missed Time: Cancel    Comment: patient no showed apt however upon calling he states he called and canceled his apt yesterdat

## 2025-02-06 ENCOUNTER — TREATMENT (OUTPATIENT)
Dept: OCCUPATIONAL THERAPY | Facility: CLINIC | Age: 45
End: 2025-02-06
Payer: MEDICAID

## 2025-02-06 ENCOUNTER — TREATMENT (OUTPATIENT)
Dept: PHYSICAL THERAPY | Facility: CLINIC | Age: 45
End: 2025-02-06
Payer: MEDICAID

## 2025-02-06 DIAGNOSIS — S62.102A LEFT WRIST FRACTURE: ICD-10-CM

## 2025-02-06 DIAGNOSIS — S32.810D MULTIPLE FRACTURES OF PELVIS WITH STABLE DISRUPTION OF PELVIC RING, SUBSEQUENT ENCOUNTER FOR FRACTURE WITH ROUTINE HEALING: ICD-10-CM

## 2025-02-06 DIAGNOSIS — M79.18 RIGHT BUTTOCK PAIN: Primary | ICD-10-CM

## 2025-02-06 DIAGNOSIS — S42.401A CLOSED FRACTURE DISLOCATION OF RIGHT ELBOW: ICD-10-CM

## 2025-02-06 PROCEDURE — 97140 MANUAL THERAPY 1/> REGIONS: CPT | Mod: GO

## 2025-02-06 PROCEDURE — 97110 THERAPEUTIC EXERCISES: CPT | Mod: GP

## 2025-02-06 PROCEDURE — 97110 THERAPEUTIC EXERCISES: CPT | Mod: GO

## 2025-02-06 ASSESSMENT — PAIN SCALES - GENERAL
PAINLEVEL_OUTOF10: 0 - NO PAIN
PAINLEVEL_OUTOF10: 0 - NO PAIN
PAINLEVEL_OUTOF10: 3

## 2025-02-06 ASSESSMENT — PAIN - FUNCTIONAL ASSESSMENT
PAIN_FUNCTIONAL_ASSESSMENT: 0-10
PAIN_FUNCTIONAL_ASSESSMENT: 0-10

## 2025-02-06 ASSESSMENT — PAIN DESCRIPTION - DESCRIPTORS: DESCRIPTORS: ACHING

## 2025-02-06 NOTE — PROGRESS NOTES
"Physical Therapy Treatment    Patient Name: Ga Camp  MRN: [id number]  : 1980  Today's Date: 2025  Kya Campbell  Time Calculation  Start Time: 1049  Stop Time: 1103  Time Calculation (min): 14 min      PT Therapeutic Procedures Time Entry  Therapeutic Exercise Time Entry: 14                         General:  General  Reason for Referral: R buttock pain  Referred By: Adrian  Past Medical History Relevant to Rehab: HX pelvic FX/surgery 26  General Comment:   Visit #6    Current Problem  Problem List Items Addressed This Visit             ICD-10-CM    Multiple fractures of pelvis with stable disruption of pelvic ring, subsequent encounter for fracture with routine healing S32.810D    Right buttock pain - Primary M79.18         Assessment:Patient identity confirmed today with name/. ;  ( 0 ) falls since last clinic visit; see goals;  the patient had no further HEP questions today; modified/added to clinic/HEP today;       Plan:   This patient agrees to discharge to ongoing HEP and home management with (good  ) progress achieved.       Subjective: I have   0/10 pain right now.   I am back to work full go now. I feel much better now.      Precautions  Precautions  Precautions Comment: none      Pain  Pain Assessment: 0-10  0-10 (Numeric) Pain Score: 0 - No pain  Pain Location: Pelvis  Pain Orientation: Right, Left    Objective  See goals; excellent reduced max C/C sx levels;  all goals MET  Manual:___0__min    There ex:__14___min  Goals reviewed, surveyed and/or updated  Reviewed and/or modified ongoing HEP today.  NOT TODAY  SciFit x3'   BHR 2x10 N  Slantboard x1'  Step ups 6\" step Fwd/Lateral 2x10   Steam boats LLE 2x10 ea orange  Hip ext OET x10  (on forearms) N  Hooklying clamshell 2x10 purple (P)   LSL R hip abd 2x10   Hooklying R SLR 2x10    Hooklying bridge 2x10   Hooklying hip adduction 2x10 5\" hold  Hooklying bridge with marching x10   Stdg R hip abd suzy(at wall) with mini-squat " "x10 N  Pirif stretch-? -A only as tolerated    POC:  Ongoing clinic PT to include:continue with open to closed chain ROM/PRE and gait/balance work as sujata;  also add core work as sujata; please avoid undue hip/LB sx exacerbation    OP EDUCATION:   Access Code: [email or web address]  URL: [email or web address]  Date: 01/23/2025  Prepared by: Chun Cardenas    Exercises  - Sidelying Hip Abduction (Mirrored)   - Standing Repeated Hip Abduction with Resistance   - Standing Repeated Hip Extension with Resistance   - Standing Repeated Hip Flexion with Resistance (Mirrored)     OP EDUCATION:  Access Code: [email or web address]  URL: [email or web address]  Date: 01/30/2025  Prepared by: Chun Cardenas    Exercises  - Plank with Hip Extension   - Isometric Gluteus Medius at [facility] Bridge         OP EDUCATION:  Access Code: [email or web address]  URL: [email or web address]  Date: 02/06/2025  Prepared by: Chun Cardenas    Exercises  - Step Up (Mirrored)   - Lateral Step Ups   - Plank with Hip Extension on Counter     Goals:  Active       PT Problem       PT Goal 1       Start:  01/09/25    Expected End:  04/09/25       1. Independent HEP to allow for 50% reduction in max ADL C/C sx ( 5/10)_ 2-3wks 4/10 max sx within the last 5 days; 1/30/25; PARTIALLY MET; 1/10 max sx within the last 5 days; 2/6/25; MET  2. Survey score improvement from 43/80 to 55/80 (LEFS) 3-4wks 68/80 as of 2/6/25; MET  3. Strength increase to allow for improved ADL walking (from 4, 4+/5 to 5/5 R hip) 3-4wks notes better ADL walking sujata; 5/5 gross R hip strength; 2/6/25; MET         PT Goal 2       Start:  01/09/25    Expected End:  04/09/25       1. \"I want my  butt/hip  to feel better\". \"My butt/hip does feel better now\";. 2/6/25; MET            "

## 2025-02-11 ENCOUNTER — TREATMENT (OUTPATIENT)
Dept: OCCUPATIONAL THERAPY | Facility: CLINIC | Age: 45
End: 2025-02-11
Payer: MEDICAID

## 2025-02-11 DIAGNOSIS — S62.102A LEFT WRIST FRACTURE: ICD-10-CM

## 2025-02-11 DIAGNOSIS — S42.401A CLOSED FRACTURE DISLOCATION OF RIGHT ELBOW: ICD-10-CM

## 2025-02-11 PROCEDURE — 97110 THERAPEUTIC EXERCISES: CPT | Mod: GO

## 2025-02-11 PROCEDURE — 97140 MANUAL THERAPY 1/> REGIONS: CPT | Mod: GO

## 2025-02-11 ASSESSMENT — PAIN SCALES - GENERAL
PAINLEVEL_OUTOF10: 0 - NO PAIN
PAINLEVEL_OUTOF10: 0 - NO PAIN

## 2025-02-11 ASSESSMENT — PAIN - FUNCTIONAL ASSESSMENT: PAIN_FUNCTIONAL_ASSESSMENT: 0-10

## 2025-02-11 ASSESSMENT — PAIN DESCRIPTION - DESCRIPTORS: DESCRIPTORS: ACHING

## 2025-02-11 NOTE — PROGRESS NOTES
Occupational Therapy    OT Treatment    Patient Name: Ga Camp  MRN: [id number]  Today's Date: 2/11/2025     Time Calculation  Start Time: 1045  Stop Time: 1130  Time Calculation (min): 45 min    Visit Number: 13  Therapeutic Procedures:      OT Therapeutic Procedures Time Entry  Manual Therapy Time Entry: 35  Therapeutic Exercise Time Entry: 10                         Current Problem:  1. Left wrist fracture  Follow Up In Occupational Therapy      2. Closed fracture dislocation of right elbow  Follow Up In Occupational Therapy          Subjective     General: Pt reported that work is doing well. Patient is independent with current HEP.       Reason for Referral: 1. Left wrist fracture  Referral to Occupational Therapy    2. Closed fracture dislocation of right elbow  Referred By: Adrian Eid Comment: Visit 11  Pain:  Pain Assessment  Pain Assessment: 0-10  0-10 (Numeric) Pain Score: 0 - No pain  Pain Location: Pelvis  Pain Orientation: Right, Left  Pain 2  Pain Score 2: 0 - No pain  Pain Location 2: Elbow  Pain Orientation 2: Right  Pain Descriptors 2: Aching  Clinical Progression 2: Gradually improving    Objective       Therapy/Activity:   Modalities  Modalities Used: Yes  Modality 1: Untimed Hotpack, Untimed Other: (comment)     Manual Therapy  Manual Therapy Activity 1: Cupping on RUE elbow and bicep  Manual Therapy Activity 2: STM to L wrist and R elbow regions  Manual Therapy Activity 3: STM to RUE (hand, forearm and bicep)  Manual Therapy Activity 4: PROM of RUE elbow  Therapeutic Exercise  Therapeutic Exercise Activity 1: Red therabar N-shape, LUE RDand UD, and twist x 10 reps of 5 secs        OP EDUCATION:   Cont HEP     Assessment:   Pt participated in therapeutic exercises and activities as needed to increase ROM and strength. Pt tolerated Cupping to RUE.      Plan:  OT Plan: TE,TA, Modalities  Duration: 8 weeks  Onset Date: 12/12/24  Certification Period Start Date: 12/12/24  Certification Period  End Date: 02/06/25  Rehab Potential: Fair  Plan of Care Agreement: Patient  Goals:  Active       OT Goals       Develop and issue HEP to help maximize ROM, strength and tolerance to help maximize return to all pre-onset activities.        Start:  12/12/24    Expected End:  03/06/25            LTG - Patient will indicate/ demonstrate the ability to resume all preinjury ADLs and IADLs without significant limits secondary to decreased ROM, decreased strength and/or pain as indicated by Quickdash score of less than 20%.        Start:  12/12/24    Expected End:  03/06/25            Patient will demonstrate a progressive increase in ROM as appropriate with BUE to be within 5-10 degrees of age norms to help patient resume normal ADL and IADL function.        Start:  12/12/24    Expected End:  03/06/25            Pain to be less than or equal to 1/10 with greater than or equal to 45 minutes activity.        Start:  12/12/24    Expected End:  03/06/25            Pt will demonstrate increased  strength, when appropriate, with BUE  to be greater than or equal to 80% of the norms to help patient resume ADLs and IADLs.        Start:  12/12/24    Expected End:  03/06/25

## 2025-02-13 ENCOUNTER — TREATMENT (OUTPATIENT)
Dept: OCCUPATIONAL THERAPY | Facility: CLINIC | Age: 45
End: 2025-02-13
Payer: MEDICAID

## 2025-02-13 DIAGNOSIS — S42.401A CLOSED FRACTURE DISLOCATION OF RIGHT ELBOW: ICD-10-CM

## 2025-02-13 DIAGNOSIS — S62.102A LEFT WRIST FRACTURE: ICD-10-CM

## 2025-02-13 PROCEDURE — 97140 MANUAL THERAPY 1/> REGIONS: CPT | Mod: GO

## 2025-02-13 PROCEDURE — 97530 THERAPEUTIC ACTIVITIES: CPT | Mod: GO

## 2025-02-13 ASSESSMENT — PAIN SCALES - GENERAL
PAINLEVEL_OUTOF10: 2
PAINLEVEL_OUTOF10: 1

## 2025-02-13 ASSESSMENT — PAIN - FUNCTIONAL ASSESSMENT: PAIN_FUNCTIONAL_ASSESSMENT: 0-10

## 2025-02-13 NOTE — PROGRESS NOTES
Occupational Therapy    OT Treatment    Patient Name: Ga Camp  MRN: [id number]  Today's Date: 2/13/2025     Time Calculation  Start Time: 1100  Stop Time: 1140  Time Calculation (min): 40 min    Visit Number: 14  Therapeutic Procedures:      OT Therapeutic Procedures Time Entry  Manual Therapy Time Entry: 30  Therapeutic Activity Time Entry: 10                         Current Problem:  1. Left wrist fracture  Follow Up In Occupational Therapy      2. Closed fracture dislocation of right elbow  Follow Up In Occupational Therapy          Subjective     General: Pt reported that he is stiff from last session. Patient is independent with current HEP.     OT Received On: 02/13/25  Reason for Referral: 1. Left wrist fracture  Referral to Occupational Therapy    2. Closed fracture dislocation of right elbow  Referred By: Adrian Eid Comment: Visit 12  Pain:  Pain Assessment  Pain Assessment: 0-10  0-10 (Numeric) Pain Score: 2  Pain Location: Elbow  Pain Orientation: Right  Pain 2  Pain Score 2: 1  Pain Location 2: Wrist  Pain Orientation 2: Left    Objective       Therapy/Activity:   Modalities  Modalities Used: Yes  Modality 1: Untimed Hotpack, Untimed Other: (comment)     Manual Therapy  Manual Therapy Activity 1: PROM of RUE elbow  Manual Therapy Activity 2: PROM in L wrist  Manual Therapy Activity 3: STM to RUE (hand, forearm and bicep)  Therapeutic Exercise  Therapeutic Exercise Activity 1: PROM over ball with 2 lbs 1 min hold x 3sets, BUE    OP EDUCATION:   Cont  hep     Assessment:   Pt participated in therapeutic exercises and activities as needed to increase ROM.      Plan:  Cont with POC  OT Plan: TE,TA, Modalities  Duration: 8 weeks  Onset Date: 12/12/24  Certification Period Start Date: 12/12/24  Certification Period End Date: 02/06/25  Rehab Potential: Fair  Plan of Care Agreement: Patient  Goals:  Active       OT Goals       Develop and issue HEP to help maximize ROM, strength and tolerance to help  maximize return to all pre-onset activities.        Start:  12/12/24    Expected End:  03/06/25            LTG - Patient will indicate/ demonstrate the ability to resume all preinjury ADLs and IADLs without significant limits secondary to decreased ROM, decreased strength and/or pain as indicated by Quickdash score of less than 20%.        Start:  12/12/24    Expected End:  03/06/25            Patient will demonstrate a progressive increase in ROM as appropriate with BUE to be within 5-10 degrees of age norms to help patient resume normal ADL and IADL function.        Start:  12/12/24    Expected End:  03/06/25            Pain to be less than or equal to 1/10 with greater than or equal to 45 minutes activity.        Start:  12/12/24    Expected End:  03/06/25            Pt will demonstrate increased  strength, when appropriate, with BUE  to be greater than or equal to 80% of the norms to help patient resume ADLs and IADLs.        Start:  12/12/24    Expected End:  03/06/25

## 2025-02-18 ENCOUNTER — TREATMENT (OUTPATIENT)
Dept: OCCUPATIONAL THERAPY | Facility: CLINIC | Age: 45
End: 2025-02-18
Payer: MEDICAID

## 2025-02-18 DIAGNOSIS — S42.401A CLOSED FRACTURE DISLOCATION OF RIGHT ELBOW: ICD-10-CM

## 2025-02-18 DIAGNOSIS — S62.102A LEFT WRIST FRACTURE: ICD-10-CM

## 2025-02-18 PROCEDURE — 97530 THERAPEUTIC ACTIVITIES: CPT | Mod: GO

## 2025-02-18 ASSESSMENT — PAIN SCALES - GENERAL
PAINLEVEL_OUTOF10: 0 - NO PAIN
PAINLEVEL_OUTOF10: 1

## 2025-02-18 ASSESSMENT — PAIN - FUNCTIONAL ASSESSMENT: PAIN_FUNCTIONAL_ASSESSMENT: 0-10

## 2025-02-18 NOTE — PROGRESS NOTES
Occupational Therapy    OT Recheck     Patient Name: Ga Camp  MRN: [id number]  Today's Date: 2/18/2025     Time Calculation  Start Time: 1045  Stop Time: 1120  Time Calculation (min): 35 min    Visit Number: 15  Therapeutic Procedures:      OT Therapeutic Procedures Time Entry  Therapeutic Activity Time Entry: 35                         Current Problem:  1. Left wrist fracture  Follow Up In Occupational Therapy      2. Closed fracture dislocation of right elbow  Follow Up In Occupational Therapy          Subjective     General:Pt reported that he is doing well. Patient is independent with current HEP.  Pt reported he will not see ortho until 3/11/2024. Pt reported he thinks he doing well with OT.        Reason for Referral: 1. Left wrist fracture  Referral to Occupational Therapy    2. Closed fracture dislocation of right elbow  Referred By: Adrian Eid Comment: Visit 13  Pain:  Pain Assessment  Pain Assessment: 0-10  0-10 (Numeric) Pain Score: 0 - No pain  Pain Location: Elbow  Pain Orientation: Right  Pain 2  Pain Score 2: 1  Pain Location 2: Wrist  Pain Orientation 2: Left    Objective       Therapy/Activity:   Modalities  Modalities Used: Yes  Modality 1: Untimed Hotpack, Untimed Other: (comment)  Therapeutic Activity  Therapeutic Activity 1: assessed ROM, FM, and strength        OP EDUCATION:   Cont HEP and cont POC     Assessment:  OT assessed ROM, FMC, and strength, pt showed deficits in ROM and strength. Pt would benefit from skilled OT to address deficits.     8/18/2025  Elbow ROM:  WFL unless documented below   Flexion Extension  Supination Pronation   Right 125 +20 75 40   Left 145 0 80 90     Wrist Measurements:  WFL unless documented below   Flexion  Extension Radial Dev Ulnar Dev   Right 80 55 25 35   Left 55 50 20 35     Digit Measurements: Left  WFL unless documented below   MCP PIP DIP DPC   Thumb 65 50 NA 0   Index 70 90 60 0   Long 80 95 80 0   Ring 80 95 70 0   Small 80 95 70 0     Digit  Measurements:  WFL unless documented below   MCP PIP DIP DPC   Thumb 60 75 NA 0   Index 85 100 70 0   Long 85 100 85 0   Ring 80 100 85 0   Small 80 100 85 0     Nine Hole Peg Test:  RUE 22 seconds   LUE 24 seconds     :  Average taken from best 3 measurements.   Trials Average   RUE 70/72/65 69   LUE 57/57/55 56.3     Lateral Pinch:   Trials Average   RUE 18/18/19 18.3   LUE 16/14/15 15     Three Point Pinch:   Trials Average   RUE 14/17/16 15.6   LUE 13/14/14 13.6       Quickdash Scores:17; 13.64%       Measurements: 12/12/2024   Elbow ROM:  WFL unless documented below    Flexion Extension  Supination Pronation   Right 95 +25 35 25   Left 150 0 45 65      Wrist Measurements:  WFL unless documented below    Flexion  Extension Radial Dev Ulnar Dev   Right 45 40 0 25   Left 45 10 5 25      Digit Measurements:Left   WFL unless documented below    MCP PIP DIP DPC   Thumb 30 30 NA 3   Index 55 80 60 4.5   Long 55 80 65 2.5   Ring 55 80 65 3   Small 50 80 65 2      Digit Measurements: Right  WFL unless documented below    MCP PIP DIP DPC   Thumb 55 55 NA 2.5   Index 45 70 45 4   Long 45 75 60 4   Ring 35 75 60 2.5   Small 35 75 60 3.5         Nine Hole Peg Test:  RUE 34 seconds   LUE 30 seconds         Sensation:   Pt reported WFL  Outcome Measures:   Quickdash Scores: 55;100%  Plan:  OT Plan: TE,TA, Modalities  Duration: 8 weeks  Onset Date: 12/12/24  Certification Period Start Date: 12/12/24  Certification Period End Date: 02/06/25  Rehab Potential: Fair  Plan of Care Agreement: Patient  Goals:  Active       OT Goals       Develop and issue HEP to help maximize ROM, strength and tolerance to help maximize return to all pre-onset activities.        Start:  12/12/24    Expected End:  03/06/25            LTG - Patient will indicate/ demonstrate the ability to resume all preinjury ADLs and IADLs without significant limits secondary to decreased ROM, decreased strength and/or pain as indicated by Quickdash score of less  than 20%.        Start:  12/12/24    Expected End:  03/06/25            Patient will demonstrate a progressive increase in ROM as appropriate with BUE to be within 5-10 degrees of age norms to help patient resume normal ADL and IADL function.        Start:  12/12/24    Expected End:  03/06/25            Pain to be less than or equal to 1/10 with greater than or equal to 45 minutes activity.        Start:  12/12/24    Expected End:  03/06/25            Pt will demonstrate increased  strength, when appropriate, with BUE  to be greater than or equal to 80% of the norms to help patient resume ADLs and IADLs.        Start:  12/12/24    Expected End:  03/06/25

## 2025-02-21 ENCOUNTER — DOCUMENTATION (OUTPATIENT)
Dept: OCCUPATIONAL THERAPY | Facility: CLINIC | Age: 45
End: 2025-02-21
Payer: MEDICAID

## 2025-02-21 NOTE — PROGRESS NOTES
Occupational Therapy                 Therapy Communication Note    Patient Name: Ga Camp  MRN: [id number]  Department:   Room: Room/bed info not found  Today's Date: 2/21/2025     Discipline: Occupational Therapy          Missed Visit Reason:   Patient states he forgot about apt, states he was at the hospital with his friend all night    Missed Time: No Show    Comment: This will not count against patient attendance    no

## 2025-02-25 ENCOUNTER — TREATMENT (OUTPATIENT)
Dept: OCCUPATIONAL THERAPY | Facility: CLINIC | Age: 45
End: 2025-02-25
Payer: MEDICAID

## 2025-02-25 DIAGNOSIS — S62.102A LEFT WRIST FRACTURE: ICD-10-CM

## 2025-02-25 DIAGNOSIS — S42.401A CLOSED FRACTURE DISLOCATION OF RIGHT ELBOW: ICD-10-CM

## 2025-02-25 PROCEDURE — 97140 MANUAL THERAPY 1/> REGIONS: CPT | Mod: GO

## 2025-02-25 PROCEDURE — 97530 THERAPEUTIC ACTIVITIES: CPT | Mod: GO

## 2025-02-25 ASSESSMENT — PAIN SCALES - GENERAL
PAINLEVEL_OUTOF10: 2
PAINLEVEL_OUTOF10: 2

## 2025-02-25 ASSESSMENT — PAIN - FUNCTIONAL ASSESSMENT: PAIN_FUNCTIONAL_ASSESSMENT: 0-10

## 2025-02-25 ASSESSMENT — PAIN DESCRIPTION - DESCRIPTORS
DESCRIPTORS: THROBBING;OTHER (COMMENT)
DESCRIPTORS: THROBBING

## 2025-02-25 NOTE — PROGRESS NOTES
Occupational Therapy    OT Treatment    Patient Name: Ga Camp  MRN: [id number]  Today's Date: 2/25/2025     Time Calculation  Start Time: 0915  Stop Time: 1000  Time Calculation (min): 45 min    Visit Number: 16  Therapeutic Procedures:      OT Therapeutic Procedures Time Entry  Manual Therapy Time Entry: 35  Therapeutic Activity Time Entry: 10                         Current Problem:  1. Left wrist fracture  Follow Up In Occupational Therapy      2. Closed fracture dislocation of right elbow  Follow Up In Occupational Therapy          Subjective     General: Pt reported that he may have over done it with cutting wood which caused some swelling in L wrist. Pt reported that he feels less stiff after therapy   OT Received On: 02/25/25  Reason for Referral: 1. Left wrist fracture  Referral to Occupational Therapy    2. Closed fracture dislocation of right elbow  Referred By: Adrian Eid Comment: Visit 14  Pain:  Pain Assessment  Pain Assessment: 0-10  0-10 (Numeric) Pain Score: 2  Pain Location: Elbow  Pain Orientation: Right  Pain Descriptors: Throbbing, Other (Comment) (grinding)  Pain 2  Pain Score 2: 2  Pain Location 2: Wrist  Pain Orientation 2: Left  Pain Descriptors 2: Throbbing    Objective       Therapy/Activity:   ,Modalities  Modalities Used: Yes  Modality 1: Untimed Hotpack, Untimed Other: (comment)  Therapeutic Activity  Therapeutic Activity 1: PROM over ball with 2 lbs 1 min hold x 3sets, BUE  Therapeutic Activity 2: SHR with 2 red bands picking up blocks on floor with RUE elbow ext x 30  Manual Therapy  Manual Therapy Activity 1: PROM of RUE elbow  Manual Therapy Activity 2: PROM in L wrist  Manual Therapy Activity 3: STM to RUE (hand, forearm and bicep)  Manual Therapy Activity 4: IASTM HG9 and HG6 to L wrist           OP EDUCATION:   Cont HEP     Assessment:   Pt participated in therapeutic exercises and activities as needed to increase ROM and strength. Pt reported stiffness in L wrist, OT  observed some swelling. STM was completed to help reduced edema. OT completed IASTM on L wrist to help mobilization, OT focus on radial and ulnar sides of the forearm and scar. Pt had stiffness in R triceps and reported pain in R elbow. Pt tolerated activities for elbow ext and completed ROM over ball without complaints of pain       Plan:  OT Plan: TE,TA, Modalities  Duration: 8 weeks  Onset Date: 12/12/24  Certification Period Start Date: 12/12/24  Certification Period End Date: 02/06/25  Rehab Potential: Fair  Plan of Care Agreement: Patient  Goals:  Active       OT Goals       Develop and issue HEP to help maximize ROM, strength and tolerance to help maximize return to all pre-onset activities.        Start:  12/12/24    Expected End:  03/06/25            LTG - Patient will indicate/ demonstrate the ability to resume all preinjury ADLs and IADLs without significant limits secondary to decreased ROM, decreased strength and/or pain as indicated by Quickdash score of less than 20%.        Start:  12/12/24    Expected End:  03/06/25            Patient will demonstrate a progressive increase in ROM as appropriate with BUE to be within 5-10 degrees of age norms to help patient resume normal ADL and IADL function.        Start:  12/12/24    Expected End:  03/06/25            Pain to be less than or equal to 1/10 with greater than or equal to 45 minutes activity.        Start:  12/12/24    Expected End:  03/06/25            Pt will demonstrate increased  strength, when appropriate, with BUE  to be greater than or equal to 80% of the norms to help patient resume ADLs and IADLs.        Start:  12/12/24    Expected End:  03/06/25

## 2025-02-27 ENCOUNTER — TREATMENT (OUTPATIENT)
Dept: OCCUPATIONAL THERAPY | Facility: CLINIC | Age: 45
End: 2025-02-27
Payer: MEDICAID

## 2025-02-27 DIAGNOSIS — S42.401D CLOSED FRACTURE DISLOCATION OF RIGHT ELBOW WITH ROUTINE HEALING, SUBSEQUENT ENCOUNTER: Primary | ICD-10-CM

## 2025-02-27 DIAGNOSIS — S62.102A LEFT WRIST FRACTURE: ICD-10-CM

## 2025-02-27 DIAGNOSIS — S42.401A CLOSED FRACTURE DISLOCATION OF RIGHT ELBOW: ICD-10-CM

## 2025-02-27 PROCEDURE — 97140 MANUAL THERAPY 1/> REGIONS: CPT | Mod: GO,CO

## 2025-02-27 ASSESSMENT — PAIN SCALES - GENERAL
PAINLEVEL_OUTOF10: 2
PAINLEVEL_OUTOF10: 2

## 2025-02-27 ASSESSMENT — PAIN DESCRIPTION - DESCRIPTORS
DESCRIPTORS: ACHING
DESCRIPTORS: ACHING

## 2025-02-27 ASSESSMENT — PAIN - FUNCTIONAL ASSESSMENT: PAIN_FUNCTIONAL_ASSESSMENT: 0-10

## 2025-02-27 NOTE — PROGRESS NOTES
Occupational Therapy    Occupational Therapy Treatment    Name: Ga Camp  MRN: [id number]  : 1980  Date: 25    Time Entry:  Time Calculation  Start Time: 09  Stop Time: 945  Time Calculation (min): 45 min        OT Therapeutic Procedures Time Entry  Manual Therapy Time Entry: 45                Assessment: Patient supine on plinth for all manual this date. Demos increased elbow extension post manual and decreased pain in L wrist after IASTM. Demos understanding of continuing massage and stretches at home.    Plan: Continue with current POC towards OT goals  OT Plan: TE,TA, Modalities  Duration: 8 weeks  Onset Date: 24  Certification Period Start Date: 24  Certification Period End Date: 25  Rehab Potential: Fair  Plan of Care Agreement: Patient    Subjective   General: Patient states he is doing well, states he has been working full time.    General  Reason for Referral: 1. Left wrist fracture  Referral to Occupational Therapy    2. Closed fracture dislocation of right elbow  Referred By: Adrian  General Comment: Visit 15  Precautions:  Precautions Comment: WBAT in BUE  Pain Assessment:  Pain Assessment  Pain Assessment: 0-10  0-10 (Numeric) Pain Score: 2  Pain Location: Elbow  Pain Orientation: Right  Pain Descriptors: Aching  Pain 2  Pain Score 2: 2  Pain Location 2: Wrist  Pain Orientation 2: Left  Pain Descriptors 2: Aching    Objective      Modalities:  Modalities Used: Yes  Modality 1: Untimed Hotpack    Therapy/Activity:  Manual Therapy  Manual Therapy Performed: Yes  Manual Therapy Activity 1: PROM of RUE elbow flex/ext  Manual Therapy Activity 2: PROM in L wrist flex/ext/sup/pro/RD/UD  Manual Therapy Activity 3: IASTM HG9 and HG6 to L wrist (scar region)  Manual Therapy Activity 4: STM to BUE to increase ROM    Goals:  Active       OT Goals       Develop and issue HEP to help maximize ROM, strength and tolerance to help maximize return to all pre-onset activities.         Start:  12/12/24    Expected End:  03/06/25            LTG - Patient will indicate/ demonstrate the ability to resume all preinjury ADLs and IADLs without significant limits secondary to decreased ROM, decreased strength and/or pain as indicated by Quickdash score of less than 20%.        Start:  12/12/24    Expected End:  03/06/25            Patient will demonstrate a progressive increase in ROM as appropriate with BUE to be within 5-10 degrees of age norms to help patient resume normal ADL and IADL function.        Start:  12/12/24    Expected End:  03/06/25            Pain to be less than or equal to 1/10 with greater than or equal to 45 minutes activity.        Start:  12/12/24    Expected End:  03/06/25            Pt will demonstrate increased  strength, when appropriate, with BUE  to be greater than or equal to 80% of the norms to help patient resume ADLs and IADLs.        Start:  12/12/24    Expected End:  03/06/25

## 2025-03-04 ENCOUNTER — TREATMENT (OUTPATIENT)
Dept: OCCUPATIONAL THERAPY | Facility: CLINIC | Age: 45
End: 2025-03-04
Payer: MEDICAID

## 2025-03-04 DIAGNOSIS — S42.401A CLOSED FRACTURE DISLOCATION OF RIGHT ELBOW: ICD-10-CM

## 2025-03-04 DIAGNOSIS — S42.401D CLOSED FRACTURE DISLOCATION OF RIGHT ELBOW WITH ROUTINE HEALING, SUBSEQUENT ENCOUNTER: Primary | ICD-10-CM

## 2025-03-04 DIAGNOSIS — S62.102A LEFT WRIST FRACTURE: ICD-10-CM

## 2025-03-04 PROCEDURE — 97140 MANUAL THERAPY 1/> REGIONS: CPT | Mod: GO,CO

## 2025-03-04 ASSESSMENT — PAIN - FUNCTIONAL ASSESSMENT: PAIN_FUNCTIONAL_ASSESSMENT: 0-10

## 2025-03-04 ASSESSMENT — PAIN SCALES - GENERAL
PAINLEVEL_OUTOF10: 0 - NO PAIN
PAINLEVEL_OUTOF10: 2

## 2025-03-04 ASSESSMENT — PAIN DESCRIPTION - DESCRIPTORS
DESCRIPTORS: ACHING
DESCRIPTORS: ACHING

## 2025-03-04 NOTE — PROGRESS NOTES
Occupational Therapy    Occupational Therapy Treatment    Name: Ga Camp  MRN: [id number]  : 1980  Date: 25    Time Entry:  Time Calculation  Start Time: 910  Stop Time: 955  Time Calculation (min): 45 min        OT Therapeutic Procedures Time Entry  Manual Therapy Time Entry: 45                Assessment: Some increased pain reported with PROM and cupping however tolerating well, demos increased ROM of BUE post stretching.     Plan: Continue with current POC towards OT goals   OT Plan: TE,TA, Modalities  Duration: 8 weeks  Onset Date: 24  Certification Period Start Date: 24  Certification Period End Date: 25  Rehab Potential: Fair  Plan of Care Agreement: Patient    Subjective   General: patient states he is doing well, states L wrist/hand is feeling better    General  Reason for Referral: 1. Left wrist fracture  Referral to Occupational Therapy    2. Closed fracture dislocation of right elbow  Referred By: Adrian  General Comment: Visit 16  Precautions:  Precautions Comment: WBAT in BUE  Pain Assessment:  Pain Assessment  Pain Assessment: 0-10  0-10 (Numeric) Pain Score: 2  Pain Location: Elbow  Pain Orientation: Right  Pain Descriptors: Aching  Pain 2  Pain Score 2: 0 - No pain  Pain Location 2: Wrist  Pain Orientation 2: Left  Pain Descriptors 2: Aching    Objective      Modalities:  Modalities Used: Yes  Modality 1: Untimed Hotpack    Therapy/Activity:   Manual Therapy  Manual Therapy Performed: Yes  Manual Therapy Activity 1: PROM of RUE elbow flex/ext  Manual Therapy Activity 2: PROM in L wrist flex/ext/sup/pro/RD/UD  Manual Therapy Activity 3: Cupping to R elbow distal bicep  Manual Therapy Activity 4: STM to BUE to increase ROM  Manual Therapy Activity 5: AAROM BUE over ball flex/ext x 10 w/ 10 second holds each      Goals:  Active       OT Goals       Develop and issue HEP to help maximize ROM, strength and tolerance to help maximize return to all pre-onset activities.         Start:  12/12/24    Expected End:  03/06/25            LTG - Patient will indicate/ demonstrate the ability to resume all preinjury ADLs and IADLs without significant limits secondary to decreased ROM, decreased strength and/or pain as indicated by Quickdash score of less than 20%.        Start:  12/12/24    Expected End:  03/06/25            Patient will demonstrate a progressive increase in ROM as appropriate with BUE to be within 5-10 degrees of age norms to help patient resume normal ADL and IADL function.        Start:  12/12/24    Expected End:  03/06/25            Pain to be less than or equal to 1/10 with greater than or equal to 45 minutes activity.        Start:  12/12/24    Expected End:  03/06/25            Pt will demonstrate increased  strength, when appropriate, with BUE  to be greater than or equal to 80% of the norms to help patient resume ADLs and IADLs.        Start:  12/12/24    Expected End:  03/06/25

## 2025-03-06 ENCOUNTER — TREATMENT (OUTPATIENT)
Dept: OCCUPATIONAL THERAPY | Facility: CLINIC | Age: 45
End: 2025-03-06
Payer: MEDICAID

## 2025-03-06 DIAGNOSIS — S42.401D CLOSED FRACTURE DISLOCATION OF RIGHT ELBOW WITH ROUTINE HEALING, SUBSEQUENT ENCOUNTER: ICD-10-CM

## 2025-03-06 DIAGNOSIS — S42.401A CLOSED FRACTURE DISLOCATION OF RIGHT ELBOW: ICD-10-CM

## 2025-03-06 DIAGNOSIS — S62.102A LEFT WRIST FRACTURE: ICD-10-CM

## 2025-03-06 DIAGNOSIS — S62.102D CLOSED FRACTURE OF LEFT WRIST WITH ROUTINE HEALING, SUBSEQUENT ENCOUNTER: Primary | ICD-10-CM

## 2025-03-06 PROCEDURE — 97110 THERAPEUTIC EXERCISES: CPT | Mod: GO,CO

## 2025-03-06 ASSESSMENT — PAIN DESCRIPTION - DESCRIPTORS: DESCRIPTORS: ACHING;SORE

## 2025-03-06 ASSESSMENT — PAIN SCALES - GENERAL
PAINLEVEL_OUTOF10: 0 - NO PAIN
PAINLEVEL_OUTOF10: 2

## 2025-03-06 ASSESSMENT — PAIN - FUNCTIONAL ASSESSMENT: PAIN_FUNCTIONAL_ASSESSMENT: 0-10

## 2025-03-06 NOTE — PROGRESS NOTES
"Occupational Therapy    Occupational Therapy Treatment    Name: Ga aCmp  MRN: [id number]  : 1980  Date: 25    Time Entry:  Time Calculation  Start Time: 900  Stop Time: 945  Time Calculation (min): 45 min        OT Therapeutic Procedures Time Entry  Therapeutic Exercise Time Entry: 45                Assessment: Ga jones increased elbow extension post stretching and exercises, intermittent fatigue and \"tightness\" reported especially in R distal biceps region. Zuleika understanding of continuing exercises and stretches at home.      Plan: Recheck next session with evaluating OT  OT Plan: TE,TA, Modalities  Duration: 8 weeks  Onset Date: 24  Certification Period Start Date: 24  Certification Period End Date: 25  Rehab Potential: Fair  Plan of Care Agreement: Patient    Subjective   General: Patient states he was sore after last session, states his wrist feels much better.    General  Reason for Referral: 1. Left wrist fracture  Referral to Occupational Therapy    2. Closed fracture dislocation of right elbow  Referred By: Adrian  General Comment: Visit 18  Precautions:  Precautions Comment: WBAT in BUE  Pain Assessment:  Pain Assessment  Pain Assessment: 0-10  0-10 (Numeric) Pain Score: 2  Pain Location: Elbow  Pain Orientation: Right  Pain Descriptors: Aching, Sore  Pain 2  Pain Score 2: 0 - No pain  Pain Location 2: Wrist  Pain Orientation 2: Left    Objective        Modalities:  Modalities Used: Yes  Modality 1: Untimed Hotpack    Therapy/Activity: Therapeutic Exercise  Therapeutic Exercise Performed: Yes  Therapeutic Exercise Activity 1: L hand on ball AAROM wrist flex/ext x 10 w/ 10 second holds each  Therapeutic Exercise Activity 2: RUE supination over ball holding 5lb DB bouncing up/down gently x 10 w/ 10 second holds each  Therapeutic Exercise Activity 3: L hand over wedge 4lb DB wrist flex/ext 2x10 w/ 5 second holds  Therapeutic Exercise Activity 4: L hand 4lb DB elbow at " side wrist sup/pro 2x10  Therapeutic Exercise Activity 5: R hand 5lb DB bicep curls 2x10  Therapeutic Exercise Activity 6: R hand 5lb DB elbow extension 2x10      Goals:  Active       OT Goals       Develop and issue HEP to help maximize ROM, strength and tolerance to help maximize return to all pre-onset activities.        Start:  12/12/24    Expected End:  03/06/25            LTG - Patient will indicate/ demonstrate the ability to resume all preinjury ADLs and IADLs without significant limits secondary to decreased ROM, decreased strength and/or pain as indicated by Quickdash score of less than 20%.        Start:  12/12/24    Expected End:  03/06/25            Patient will demonstrate a progressive increase in ROM as appropriate with BUE to be within 5-10 degrees of age norms to help patient resume normal ADL and IADL function.        Start:  12/12/24    Expected End:  03/06/25            Pain to be less than or equal to 1/10 with greater than or equal to 45 minutes activity.        Start:  12/12/24    Expected End:  03/06/25            Pt will demonstrate increased  strength, when appropriate, with BUE  to be greater than or equal to 80% of the norms to help patient resume ADLs and IADLs.        Start:  12/12/24    Expected End:  03/06/25

## 2025-03-10 ENCOUNTER — APPOINTMENT (OUTPATIENT)
Dept: OCCUPATIONAL THERAPY | Facility: CLINIC | Age: 45
End: 2025-03-10
Payer: MEDICAID

## 2025-03-11 ENCOUNTER — APPOINTMENT (OUTPATIENT)
Dept: OCCUPATIONAL THERAPY | Facility: CLINIC | Age: 45
End: 2025-03-11
Payer: MEDICAID

## 2025-03-13 ENCOUNTER — APPOINTMENT (OUTPATIENT)
Dept: OCCUPATIONAL THERAPY | Facility: CLINIC | Age: 45
End: 2025-03-13
Payer: MEDICAID

## 2025-03-13 DIAGNOSIS — S42.401A CLOSED FRACTURE DISLOCATION OF RIGHT ELBOW: ICD-10-CM

## 2025-03-13 DIAGNOSIS — S62.102A LEFT WRIST FRACTURE: ICD-10-CM

## 2025-03-13 PROCEDURE — 97140 MANUAL THERAPY 1/> REGIONS: CPT | Mod: GO

## 2025-03-13 PROCEDURE — 97530 THERAPEUTIC ACTIVITIES: CPT | Mod: GO

## 2025-03-13 ASSESSMENT — PAIN SCALES - GENERAL: PAINLEVEL_OUTOF10: 0 - NO PAIN

## 2025-03-13 ASSESSMENT — PAIN - FUNCTIONAL ASSESSMENT: PAIN_FUNCTIONAL_ASSESSMENT: 0-10

## 2025-03-13 NOTE — PROGRESS NOTES
Occupational Therapy    OT Recheck    Patient Name: Ga Camp  MRN: [id number]  Today's Date: 3/13/2025     Time Calculation  Start Time: 0915  Stop Time: 1000  Time Calculation (min): 45 min    Visit Number: 20  Therapeutic Procedures:      OT Therapeutic Procedures Time Entry  Manual Therapy Time Entry: 25  Therapeutic Activity Time Entry: 20                         Current Problem:  1. Left wrist fracture  Follow Up In Occupational Therapy      2. Closed fracture dislocation of right elbow  Follow Up In Occupational Therapy          Subjective     General: Patient is independent with current HEP. Pt is back to work with all duties he did prior. Pt reported his RUE gets straighter in the afternoon after moving it. Pt agreed to cont therapy to increase ROM in elbow. Pt cancelled follow up with ortho but rescheduled the appointment for later this month.        Reason for Referral: 1. Left wrist fracture  Referral to Occupational Therapy    2. Closed fracture dislocation of right elbow  Referred By: Adrian Eid Comment: Visit 19  Pain:  Pain Assessment  Pain Assessment: 0-10  0-10 (Numeric) Pain Score: 0 - No pain    Objective       Therapy/Activity:   Modalities  Modalities Used: Yes  Modality 1: Untimed Hotpack  Therapeutic Activity  Therapeutic Activity 1: Assessed ROM and strength  Manual Therapy  Manual Therapy Activity 1: Cupping to R elbow distal bicep and forearm  Manual Therapy Activity 2: PROM of RUE elbow flex/ext  Manual Therapy Activity 3: STM to RUE         OP EDUCATION:   Cont HEP and     Assessment:   Pt participated in therapeutic exercises and activities as needed to increase ROM. OT assessed rom dn strength, pt showed deficits in rom. Pt would benefit from skilled OT to address deficits. Pt tolerated cupping on bicep and forearm of RUE. Pt has shown significant increase in ROM and strength since eval, pt is cont to make improvements,        3/13/2025  Elbow ROM:  WFL unless documented below    Flexion Extension    Right 115 +15   Left 150 -5     Wrist Measurements:  WFL unless documented below   Flexion  Extension Radial Dev Ulnar Dev Supination Pronation   Left 55 50 10 40 80 90       :  Average taken from best 3 measurements.   Trials Average   RUE 70/70/70 70   LUE 60/68/75 67.6     Lateral Pinch:   Trials Average   RUE 20/20/18 19.3   LUE 16/18/16 16.6     Three Point Pinch:   Trials Average   RUE 15/16/17 16   LUE 14/17/18 16.3     Quickdash Scores:13; 4.55%    2/18/2025  Elbow ROM:  WFL unless documented below    Flexion Extension  Supination Pronation   Right 125 +20 75 40   Left 145 0 80 90      Wrist Measurements:  WFL unless documented below    Flexion  Extension Radial Dev Ulnar Dev   Right 80 55 25 35   Left 55 50 20 35      Digit Measurements: Left  WFL unless documented below    MCP PIP DIP DPC   Thumb 65 50 NA 0   Index 70 90 60 0   Long 80 95 80 0   Ring 80 95 70 0   Small 80 95 70 0      Digit Measurements:  WFL unless documented below    MCP PIP DIP DPC   Thumb 60 75 NA 0   Index 85 100 70 0   Long 85 100 85 0   Ring 80 100 85 0   Small 80 100 85 0      Nine Hole Peg Test:  RUE 22 seconds   LUE 24 seconds      :  Average taken from best 3 measurements.    Trials Average   RUE 70/72/65 69   LUE 57/57/55 56.3      Lateral Pinch:    Trials Average   RUE 18/18/19 18.3   LUE 16/14/15 15      Three Point Pinch:    Trials Average   RUE 14/17/16 15.6   LUE 13/14/14 13.6         Quickdash Scores:17; 13.64%         Measurements: 12/12/2024   Elbow ROM:  WFL unless documented below    Flexion Extension  Supination Pronation   Right 95 +25 35 25   Left 150 0 45 65      Wrist Measurements:  WFL unless documented below    Flexion  Extension Radial Dev Ulnar Dev   Right 45 40 0 25   Left 45 10 5 25      Digit Measurements:Left   WFL unless documented below    MCP PIP DIP DPC   Thumb 30 30 NA 3   Index 55 80 60 4.5   Long 55 80 65 2.5   Ring 55 80 65 3   Small 50 80 65 2      Digit Measurements:  Right  WFL unless documented below    MCP PIP DIP DPC   Thumb 55 55 NA 2.5   Index 45 70 45 4   Long 45 75 60 4   Ring 35 75 60 2.5   Small 35 75 60 3.5         Nine Hole Peg Test:  RUE 34 seconds   LUE 30 seconds         Sensation:   Pt reported WFL  Outcome Measures:   Quickdash Scores: 55;100%  Plan:  Cont with OT for 2 weeks   OT Plan: TE,TA, Modalities  Duration: 8 weeks  Onset Date: 12/12/24  Certification Period Start Date: 12/12/24  Certification Period End Date: 02/06/25  Rehab Potential: Fair  Plan of Care Agreement: Patient  Goals:  Active       OT Goals       Develop and issue HEP to help maximize ROM, strength and tolerance to help maximize return to all pre-onset activities.        Start:  12/12/24    Expected End:  03/06/25            LTG - Patient will indicate/ demonstrate the ability to resume all preinjury ADLs and IADLs without significant limits secondary to decreased ROM, decreased strength and/or pain as indicated by Quickdash score of less than 20%.        Start:  12/12/24    Expected End:  03/06/25            Patient will demonstrate a progressive increase in ROM as appropriate with BUE to be within 5-10 degrees of age norms to help patient resume normal ADL and IADL function.        Start:  12/12/24    Expected End:  03/06/25            Pain to be less than or equal to 1/10 with greater than or equal to 45 minutes activity.        Start:  12/12/24    Expected End:  03/06/25            Pt will demonstrate increased  strength, when appropriate, with BUE  to be greater than or equal to 80% of the norms to help patient resume ADLs and IADLs.        Start:  12/12/24    Expected End:  03/06/25

## 2025-03-18 ENCOUNTER — DOCUMENTATION (OUTPATIENT)
Dept: OCCUPATIONAL THERAPY | Facility: CLINIC | Age: 45
End: 2025-03-18
Payer: MEDICAID

## 2025-03-18 NOTE — PROGRESS NOTES
Occupational Therapy                 Therapy Communication Note    Patient Name: Ga Camp  MRN: [id number]  Department:   Room: Room/bed info not found  Today's Date: 3/18/2025     Discipline: Occupational Therapy          Missed Visit Reason:   Unknown reason, attempted to call No asnwer    Missed Time: No Show    Comment:

## 2025-03-20 ENCOUNTER — DOCUMENTATION (OUTPATIENT)
Dept: OCCUPATIONAL THERAPY | Facility: CLINIC | Age: 45
End: 2025-03-20
Payer: MEDICAID

## 2025-03-20 NOTE — PROGRESS NOTES
Occupational Therapy                 Therapy Communication Note    Patient Name: Ga Camp  MRN: [id number]  Department:   Room: Room/bed info not found  Today's Date: 3/20/2025     Discipline: Occupational Therapy          Missed Visit Reason:   Unknown reason    Missed Time: No Show    Comment: VM left providing other available times for apt today if patient is able to make it in.

## 2025-03-25 ENCOUNTER — DOCUMENTATION (OUTPATIENT)
Dept: OCCUPATIONAL THERAPY | Facility: CLINIC | Age: 45
End: 2025-03-25
Payer: MEDICAID

## 2025-03-25 NOTE — PROGRESS NOTES
Occupational Therapy                 Therapy Communication Note    Patient Name: Ga Camp  MRN: [id number]  Department:   Room: Room/bed info not found  Today's Date: 3/25/2025     Discipline: Occupational Therapy          Missed Visit Reason:   Patient states he has had a lot going on and has not been able to call and cancel apts    Missed Time: No Show    Comment: Patient advised to reschedule recheck for next week or may be DC from services d/t 4 cancels and 5 no shows

## 2025-03-26 ENCOUNTER — DOCUMENTATION (OUTPATIENT)
Dept: OCCUPATIONAL THERAPY | Facility: CLINIC | Age: 45
End: 2025-03-26
Payer: MEDICAID

## 2025-03-26 ENCOUNTER — APPOINTMENT (OUTPATIENT)
Dept: OCCUPATIONAL THERAPY | Facility: CLINIC | Age: 45
End: 2025-03-26
Payer: MEDICAID

## 2025-03-26 NOTE — PROGRESS NOTES
Occupational Therapy                 Therapy Communication Note    Patient Name: Ga Camp  MRN: [id number]  Today's Date: 3/26/2025     Discipline: Occupational Therapy    Missed Visit Reason:  cancelled     Missed Time: Cancel

## 2025-04-10 ENCOUNTER — DOCUMENTATION (OUTPATIENT)
Dept: OCCUPATIONAL THERAPY | Facility: CLINIC | Age: 45
End: 2025-04-10
Payer: MEDICAID

## 2025-04-10 NOTE — PROGRESS NOTES
Occupational Therapy    Discharge Summary    Name: Ga Camp  MRN: [id number]  : 1980  Date: 04/10/25    Discharge Summary: OT    Discharge Information: Date of last visit 3/13/2025 and Date of evaluation 2024    Therapy Summary: Pt missed 4 appointments and did not Schedule after talking to DIAZ      Discharge Status: Discharge     Rehab Discharge Reason: Failed to schedule and/or keep follow-up appointment(s)

## 2025-07-01 ENCOUNTER — APPOINTMENT (OUTPATIENT)
Dept: PRIMARY CARE | Facility: CLINIC | Age: 45
End: 2025-07-01
Payer: MEDICAID

## 2025-08-07 ENCOUNTER — APPOINTMENT (OUTPATIENT)
Dept: PRIMARY CARE | Facility: CLINIC | Age: 45
End: 2025-08-07
Payer: MEDICAID

## 2025-08-07 VITALS
DIASTOLIC BLOOD PRESSURE: 90 MMHG | HEART RATE: 69 BPM | OXYGEN SATURATION: 97 % | WEIGHT: 158.9 LBS | HEIGHT: 70 IN | SYSTOLIC BLOOD PRESSURE: 122 MMHG | BODY MASS INDEX: 22.75 KG/M2

## 2025-08-07 DIAGNOSIS — Z79.1 NSAID LONG-TERM USE: ICD-10-CM

## 2025-08-07 DIAGNOSIS — S32.810D MULTIPLE FRACTURES OF PELVIS WITH STABLE DISRUPTION OF PELVIC RING, SUBSEQUENT ENCOUNTER FOR FRACTURE WITH ROUTINE HEALING: ICD-10-CM

## 2025-08-07 DIAGNOSIS — G62.9 NEUROPATHY: Primary | ICD-10-CM

## 2025-08-07 DIAGNOSIS — Z00.00 ROUTINE GENERAL MEDICAL EXAMINATION AT A HEALTH CARE FACILITY: ICD-10-CM

## 2025-08-07 DIAGNOSIS — M65.4 DE QUERVAIN'S TENOSYNOVITIS, LEFT: ICD-10-CM

## 2025-08-07 DIAGNOSIS — Z13.220 LIPID SCREENING: ICD-10-CM

## 2025-08-07 PROCEDURE — 1036F TOBACCO NON-USER: CPT

## 2025-08-07 PROCEDURE — 99204 OFFICE O/P NEW MOD 45 MIN: CPT

## 2025-08-07 PROCEDURE — 3008F BODY MASS INDEX DOCD: CPT

## 2025-08-07 RX ORDER — GABAPENTIN 100 MG/1
100 CAPSULE ORAL 3 TIMES DAILY
Qty: 90 CAPSULE | Refills: 5 | Status: SHIPPED | OUTPATIENT
Start: 2025-08-07 | End: 2026-02-03

## 2025-08-07 ASSESSMENT — ENCOUNTER SYMPTOMS
TROUBLE SWALLOWING: 0
DIARRHEA: 0
DIAPHORESIS: 0
JOINT SWELLING: 1
NUMBNESS: 0
CHILLS: 0
HEADACHES: 0
MYALGIAS: 1
POLYDIPSIA: 0
WOUND: 0
RECTAL PAIN: 0
ABDOMINAL PAIN: 0
DIFFICULTY URINATING: 0
WEAKNESS: 0
POLYPHAGIA: 0
UNEXPECTED WEIGHT CHANGE: 0
SHORTNESS OF BREATH: 0
NAUSEA: 0
FATIGUE: 0
ARTHRALGIAS: 1
BACK PAIN: 1
CHEST TIGHTNESS: 0
NECK STIFFNESS: 1
NECK PAIN: 1
NERVOUS/ANXIOUS: 0
FREQUENCY: 0
PALPITATIONS: 0
CONSTIPATION: 0

## 2025-08-07 NOTE — PROGRESS NOTES
"Subjective   Patient ID: Ga Camp is a 44 y.o. male who presents for Establish Care (Pt is here today to establish care as a new pt. ).    Patient presents today to establish primary care.    Chronic pain syndrome: Had a 37 foot fall 9 months ago, required surgical repair and reconstruction of pelvis and bilateral arms/wrists.  Since his surgery he has a significant amount of neuropathy.  Has intermittent pains/paresthesias to upper & lower extremities.  He has been treating the pain with excessive amount of ibuprofen.  Reports he can go through 90 pills and a week.  Instructed him on proper dosing for ibuprofen.  Will add gabapentin for treatment of neuropathy.  He will also start using Voltaren topical for wrist tendinitis.  Follow-up in 3 months for effectiveness of medication and possible increased dosing or change of medication.    Baseline labs ordered.         Review of Systems   Constitutional:  Negative for chills, diaphoresis, fatigue and unexpected weight change.   HENT:  Negative for dental problem, tinnitus and trouble swallowing.    Eyes:  Negative for visual disturbance.   Respiratory:  Negative for chest tightness and shortness of breath.    Cardiovascular:  Negative for chest pain, palpitations and leg swelling.   Gastrointestinal:  Negative for abdominal pain, constipation, diarrhea, nausea and rectal pain.   Endocrine: Negative for polydipsia, polyphagia and polyuria.   Genitourinary:  Negative for difficulty urinating, frequency and urgency.   Musculoskeletal:  Positive for arthralgias, back pain, joint swelling, myalgias, neck pain and neck stiffness.   Skin:  Negative for pallor and wound.   Neurological:  Negative for syncope, weakness, numbness and headaches.   Psychiatric/Behavioral:  Negative for suicidal ideas. The patient is not nervous/anxious.        Objective   /90 (BP Location: Left arm, Patient Position: Sitting)   Pulse 69   Ht 1.772 m (5' 9.76\")   Wt 72.1 kg (158 lb " 14.4 oz)   SpO2 97%   BMI 22.95 kg/m²     Physical Exam  Vitals and nursing note reviewed.   Constitutional:       General: He is not in acute distress.     Appearance: Normal appearance.   HENT:      Head: Normocephalic.      Nose: Nose normal.      Mouth/Throat:      Mouth: Mucous membranes are moist.      Pharynx: Oropharynx is clear.     Eyes:      General: No scleral icterus.     Pupils: Pupils are equal, round, and reactive to light.     Neck:      Vascular: No carotid bruit.     Cardiovascular:      Rate and Rhythm: Normal rate and regular rhythm.      Pulses: Normal pulses.      Heart sounds: Normal heart sounds. No murmur heard.  Pulmonary:      Effort: Pulmonary effort is normal. No respiratory distress.      Breath sounds: Normal breath sounds. No stridor. No wheezing, rhonchi or rales.   Abdominal:      General: Bowel sounds are normal. There is no distension.      Palpations: Abdomen is soft.      Tenderness: There is no abdominal tenderness. There is no right CVA tenderness or left CVA tenderness.     Musculoskeletal:         General: Tenderness (Bilateral upper and lower extremities, back and pelvis) present. No swelling. Normal range of motion.      Cervical back: Normal range of motion.      Right lower leg: No edema.      Left lower leg: No edema.     Skin:     General: Skin is warm and dry.      Capillary Refill: Capillary refill takes less than 2 seconds.     Neurological:      General: No focal deficit present.      Mental Status: He is alert and oriented to person, place, and time. Mental status is at baseline.     Psychiatric:         Mood and Affect: Mood normal.         Behavior: Behavior normal.         Thought Content: Thought content normal.         Judgment: Judgment normal.         Assessment/Plan   Diagnoses and all orders for this visit:  Neuropathy  -     gabapentin (Neurontin) 100 mg capsule; Take 1 capsule (100 mg) by mouth 3 times a day.  Routine general medical examination at a  health care facility  -     gabapentin (Neurontin) 100 mg capsule; Take 1 capsule (100 mg) by mouth 3 times a day.  Lipid screening  -     Lipid Panel; Future  NSAID long-term use  -     Comprehensive Metabolic Panel; Future  -     CBC; Future  De Quervain's tenosynovitis, left  Multiple fractures of pelvis with stable disruption of pelvic ring, subsequent encounter for fracture with routine healing

## 2025-09-05 ENCOUNTER — APPOINTMENT (OUTPATIENT)
Dept: RADIOLOGY | Facility: HOSPITAL | Age: 45
End: 2025-09-05
Payer: MEDICAID

## 2025-09-05 ENCOUNTER — HOSPITAL ENCOUNTER (EMERGENCY)
Facility: HOSPITAL | Age: 45
Discharge: HOME | End: 2025-09-05
Attending: EMERGENCY MEDICINE
Payer: MEDICAID

## 2025-09-05 VITALS
HEART RATE: 50 BPM | TEMPERATURE: 97 F | HEIGHT: 72 IN | OXYGEN SATURATION: 95 % | SYSTOLIC BLOOD PRESSURE: 142 MMHG | WEIGHT: 157 LBS | BODY MASS INDEX: 21.26 KG/M2 | DIASTOLIC BLOOD PRESSURE: 96 MMHG | RESPIRATION RATE: 16 BRPM

## 2025-09-05 DIAGNOSIS — R11.2 NAUSEA AND VOMITING, UNSPECIFIED VOMITING TYPE: ICD-10-CM

## 2025-09-05 DIAGNOSIS — R07.9 ACUTE CHEST PAIN: ICD-10-CM

## 2025-09-05 DIAGNOSIS — R10.13 ABDOMINAL PAIN, EPIGASTRIC: Primary | ICD-10-CM

## 2025-09-05 LAB
ALBUMIN SERPL BCP-MCNC: 4.1 G/DL (ref 3.4–5)
ALP SERPL-CCNC: 84 U/L (ref 33–120)
ALT SERPL W P-5'-P-CCNC: 18 U/L (ref 10–52)
ANION GAP SERPL CALC-SCNC: 13 MMOL/L (ref 10–20)
AST SERPL W P-5'-P-CCNC: 15 U/L (ref 9–39)
BASOPHILS # BLD AUTO: 0.05 X10*3/UL (ref 0–0.1)
BASOPHILS NFR BLD AUTO: 0.5 %
BILIRUB SERPL-MCNC: 0.4 MG/DL (ref 0–1.2)
BNP SERPL-MCNC: 9 PG/ML (ref 0–99)
BUN SERPL-MCNC: 17 MG/DL (ref 6–23)
CALCIUM SERPL-MCNC: 9.8 MG/DL (ref 8.6–10.3)
CARDIAC TROPONIN I PNL SERPL HS: 4 NG/L (ref 0–20)
CARDIAC TROPONIN I PNL SERPL HS: 4 NG/L (ref 0–20)
CHLORIDE SERPL-SCNC: 100 MMOL/L (ref 98–107)
CO2 SERPL-SCNC: 25 MMOL/L (ref 21–32)
CREAT SERPL-MCNC: 0.93 MG/DL (ref 0.5–1.3)
EGFRCR SERPLBLD CKD-EPI 2021: >90 ML/MIN/1.73M*2
EOSINOPHIL # BLD AUTO: 0.06 X10*3/UL (ref 0–0.7)
EOSINOPHIL NFR BLD AUTO: 0.6 %
ERYTHROCYTE [DISTWIDTH] IN BLOOD BY AUTOMATED COUNT: 12.1 % (ref 11.5–14.5)
FLUAV RNA RESP QL NAA+PROBE: NOT DETECTED
FLUBV RNA RESP QL NAA+PROBE: NOT DETECTED
GLUCOSE BLD MANUAL STRIP-MCNC: 101 MG/DL (ref 74–99)
GLUCOSE SERPL-MCNC: 150 MG/DL (ref 74–99)
HCT VFR BLD AUTO: 50 % (ref 41–52)
HGB BLD-MCNC: 17 G/DL (ref 13.5–17.5)
IMM GRANULOCYTES # BLD AUTO: 0.03 X10*3/UL (ref 0–0.7)
IMM GRANULOCYTES NFR BLD AUTO: 0.3 % (ref 0–0.9)
LACTATE SERPL-SCNC: 1.5 MMOL/L (ref 0.4–2)
LACTATE SERPL-SCNC: 2.1 MMOL/L (ref 0.4–2)
LIPASE SERPL-CCNC: 144 U/L (ref 9–82)
LYMPHOCYTES # BLD AUTO: 1.33 X10*3/UL (ref 1.2–4.8)
LYMPHOCYTES NFR BLD AUTO: 14.1 %
MAGNESIUM SERPL-MCNC: 1.9 MG/DL (ref 1.6–2.4)
MCH RBC QN AUTO: 30.1 PG (ref 26–34)
MCHC RBC AUTO-ENTMCNC: 34 G/DL (ref 32–36)
MCV RBC AUTO: 89 FL (ref 80–100)
MONOCYTES # BLD AUTO: 0.79 X10*3/UL (ref 0.1–1)
MONOCYTES NFR BLD AUTO: 8.4 %
NEUTROPHILS # BLD AUTO: 7.2 X10*3/UL (ref 1.2–7.7)
NEUTROPHILS NFR BLD AUTO: 76.1 %
NRBC BLD-RTO: 0 /100 WBCS (ref 0–0)
PLATELET # BLD AUTO: 366 X10*3/UL (ref 150–450)
POTASSIUM SERPL-SCNC: 4.1 MMOL/L (ref 3.5–5.3)
PROT SERPL-MCNC: 7.1 G/DL (ref 6.4–8.2)
RBC # BLD AUTO: 5.64 X10*6/UL (ref 4.5–5.9)
RSV RNA RESP QL NAA+PROBE: NOT DETECTED
SARS-COV-2 RNA RESP QL NAA+PROBE: NOT DETECTED
SODIUM SERPL-SCNC: 134 MMOL/L (ref 136–145)
WBC # BLD AUTO: 9.5 X10*3/UL (ref 4.4–11.3)

## 2025-09-05 PROCEDURE — 36415 COLL VENOUS BLD VENIPUNCTURE: CPT | Performed by: EMERGENCY MEDICINE

## 2025-09-05 PROCEDURE — 74177 CT ABD & PELVIS W/CONTRAST: CPT

## 2025-09-05 PROCEDURE — 83735 ASSAY OF MAGNESIUM: CPT | Performed by: EMERGENCY MEDICINE

## 2025-09-05 PROCEDURE — 99285 EMERGENCY DEPT VISIT HI MDM: CPT | Mod: 25 | Performed by: EMERGENCY MEDICINE

## 2025-09-05 PROCEDURE — 85025 COMPLETE CBC W/AUTO DIFF WBC: CPT | Performed by: EMERGENCY MEDICINE

## 2025-09-05 PROCEDURE — 71275 CT ANGIOGRAPHY CHEST: CPT

## 2025-09-05 PROCEDURE — 83690 ASSAY OF LIPASE: CPT | Performed by: EMERGENCY MEDICINE

## 2025-09-05 PROCEDURE — 84484 ASSAY OF TROPONIN QUANT: CPT | Performed by: EMERGENCY MEDICINE

## 2025-09-05 PROCEDURE — 87637 SARSCOV2&INF A&B&RSV AMP PRB: CPT | Performed by: EMERGENCY MEDICINE

## 2025-09-05 PROCEDURE — 83605 ASSAY OF LACTIC ACID: CPT | Performed by: EMERGENCY MEDICINE

## 2025-09-05 PROCEDURE — 2500000004 HC RX 250 GENERAL PHARMACY W/ HCPCS (ALT 636 FOR OP/ED): Performed by: EMERGENCY MEDICINE

## 2025-09-05 PROCEDURE — 2550000001 HC RX 255 CONTRASTS: Mod: JW | Performed by: EMERGENCY MEDICINE

## 2025-09-05 PROCEDURE — 2500000004 HC RX 250 GENERAL PHARMACY W/ HCPCS (ALT 636 FOR OP/ED)

## 2025-09-05 PROCEDURE — 80053 COMPREHEN METABOLIC PANEL: CPT | Performed by: EMERGENCY MEDICINE

## 2025-09-05 PROCEDURE — 83880 ASSAY OF NATRIURETIC PEPTIDE: CPT | Performed by: EMERGENCY MEDICINE

## 2025-09-05 PROCEDURE — 82947 ASSAY GLUCOSE BLOOD QUANT: CPT

## 2025-09-05 RX ORDER — DICYCLOMINE HYDROCHLORIDE 20 MG/1
20 TABLET ORAL
Qty: 40 TABLET | Refills: 0 | Status: SHIPPED | OUTPATIENT
Start: 2025-09-05 | End: 2025-09-15

## 2025-09-05 RX ORDER — ONDANSETRON HYDROCHLORIDE 2 MG/ML
INJECTION, SOLUTION INTRAVENOUS
Status: COMPLETED
Start: 2025-09-05 | End: 2025-09-05

## 2025-09-05 RX ORDER — PANTOPRAZOLE SODIUM 40 MG/10ML
40 INJECTION, POWDER, LYOPHILIZED, FOR SOLUTION INTRAVENOUS ONCE
Status: DISCONTINUED | OUTPATIENT
Start: 2025-09-05 | End: 2025-09-06 | Stop reason: HOSPADM

## 2025-09-05 RX ORDER — ONDANSETRON HYDROCHLORIDE 2 MG/ML
4 INJECTION, SOLUTION INTRAVENOUS ONCE
Status: COMPLETED | OUTPATIENT
Start: 2025-09-05 | End: 2025-09-05

## 2025-09-05 RX ORDER — MORPHINE SULFATE 4 MG/ML
4 INJECTION, SOLUTION INTRAMUSCULAR; INTRAVENOUS ONCE
Status: COMPLETED | OUTPATIENT
Start: 2025-09-05 | End: 2025-09-05

## 2025-09-05 RX ORDER — PANTOPRAZOLE SODIUM 40 MG/1
40 TABLET, DELAYED RELEASE ORAL
Qty: 30 TABLET | Refills: 0 | Status: SHIPPED | OUTPATIENT
Start: 2025-09-05 | End: 2025-10-05

## 2025-09-05 RX ORDER — SODIUM CHLORIDE 9 MG/ML
125 INJECTION, SOLUTION INTRAVENOUS CONTINUOUS
Status: DISCONTINUED | OUTPATIENT
Start: 2025-09-05 | End: 2025-09-06 | Stop reason: HOSPADM

## 2025-09-05 RX ORDER — ONDANSETRON 8 MG/1
8 TABLET, ORALLY DISINTEGRATING ORAL EVERY 8 HOURS PRN
Qty: 9 TABLET | Refills: 0 | Status: SHIPPED | OUTPATIENT
Start: 2025-09-05 | End: 2025-09-08

## 2025-09-05 RX ADMIN — ONDANSETRON 4 MG: 2 INJECTION INTRAMUSCULAR; INTRAVENOUS at 20:25

## 2025-09-05 RX ADMIN — SODIUM CHLORIDE 125 ML/HR: 0.9 INJECTION, SOLUTION INTRAVENOUS at 22:38

## 2025-09-05 RX ADMIN — ONDANSETRON HYDROCHLORIDE 4 MG: 2 INJECTION, SOLUTION INTRAVENOUS at 20:25

## 2025-09-05 RX ADMIN — MORPHINE SULFATE 4 MG: 4 INJECTION, SOLUTION INTRAMUSCULAR; INTRAVENOUS at 22:38

## 2025-09-05 RX ADMIN — IOHEXOL 69 ML: 350 INJECTION, SOLUTION INTRAVENOUS at 21:36

## 2025-09-05 RX ADMIN — SODIUM CHLORIDE 1000 ML: 0.9 INJECTION, SOLUTION INTRAVENOUS at 19:52

## 2025-09-05 ASSESSMENT — PAIN SCALES - GENERAL
PAINLEVEL_OUTOF10: 3
PAINLEVEL_OUTOF10: 8
PAINLEVEL_OUTOF10: 8
PAINLEVEL_OUTOF10: 6

## 2025-09-05 ASSESSMENT — PAIN DESCRIPTION - LOCATION
LOCATION_2: CHEST
LOCATION: ABDOMEN

## 2025-09-05 ASSESSMENT — VISUAL ACUITY: OU: 1

## 2025-09-05 ASSESSMENT — PAIN DESCRIPTION - PAIN TYPE: TYPE: ACUTE PAIN

## 2025-09-05 ASSESSMENT — PAIN - FUNCTIONAL ASSESSMENT: PAIN_FUNCTIONAL_ASSESSMENT: 0-10

## 2025-11-07 ENCOUNTER — APPOINTMENT (OUTPATIENT)
Dept: PRIMARY CARE | Facility: CLINIC | Age: 45
End: 2025-11-07
Payer: MEDICAID